# Patient Record
Sex: MALE | Race: WHITE
[De-identification: names, ages, dates, MRNs, and addresses within clinical notes are randomized per-mention and may not be internally consistent; named-entity substitution may affect disease eponyms.]

---

## 2017-10-22 ENCOUNTER — HEALTH MAINTENANCE LETTER (OUTPATIENT)
Age: 22
End: 2017-10-22

## 2017-11-12 ENCOUNTER — HEALTH MAINTENANCE LETTER (OUTPATIENT)
Age: 22
End: 2017-11-12

## 2018-01-15 ENCOUNTER — OFFICE VISIT (OUTPATIENT)
Dept: FAMILY MEDICINE | Facility: CLINIC | Age: 23
End: 2018-01-15
Payer: COMMERCIAL

## 2018-01-15 VITALS
TEMPERATURE: 97.8 F | HEIGHT: 76 IN | BODY MASS INDEX: 31.78 KG/M2 | WEIGHT: 261 LBS | HEART RATE: 70 BPM | SYSTOLIC BLOOD PRESSURE: 120 MMHG | DIASTOLIC BLOOD PRESSURE: 68 MMHG

## 2018-01-15 DIAGNOSIS — R07.0 THROAT PAIN: ICD-10-CM

## 2018-01-15 DIAGNOSIS — J03.90 TONSILLITIS: Primary | ICD-10-CM

## 2018-01-15 DIAGNOSIS — H61.23 BILATERAL IMPACTED CERUMEN: ICD-10-CM

## 2018-01-15 LAB
DEPRECATED S PYO AG THROAT QL EIA: NORMAL
SPECIMEN SOURCE: NORMAL

## 2018-01-15 PROCEDURE — 87880 STREP A ASSAY W/OPTIC: CPT | Performed by: PHYSICIAN ASSISTANT

## 2018-01-15 PROCEDURE — 69210 REMOVE IMPACTED EAR WAX UNI: CPT | Mod: 50 | Performed by: PHYSICIAN ASSISTANT

## 2018-01-15 PROCEDURE — 87081 CULTURE SCREEN ONLY: CPT | Performed by: PHYSICIAN ASSISTANT

## 2018-01-15 PROCEDURE — 99213 OFFICE O/P EST LOW 20 MIN: CPT | Mod: 25 | Performed by: PHYSICIAN ASSISTANT

## 2018-01-15 RX ORDER — DEXAMETHASONE 4 MG/1
8 TABLET ORAL
Qty: 4 TABLET | Refills: 0 | Status: SHIPPED | OUTPATIENT
Start: 2018-01-15 | End: 2018-01-17

## 2018-01-15 NOTE — LETTER
January 17, 2018      Jose Enrique Beaver  [address] [facility] [address]            The results of your recent throat culture were negative.  If you have any further questions or concerns please contact the clinic            Sincerely,        Nannette Grady PA-C/stephy

## 2018-01-15 NOTE — NURSING NOTE
"Chief Complaint   Patient presents with     Throat Problem       Initial /68  Pulse 70  Temp 97.8  F (36.6  C) (Tympanic)  Ht 6' 3.75\" (1.924 m)  Wt 261 lb (118.4 kg)  BMI 31.98 kg/m2 Estimated body mass index is 31.98 kg/(m^2) as calculated from the following:    Height as of this encounter: 6' 3.75\" (1.924 m).    Weight as of this encounter: 261 lb (118.4 kg).  Medication Reconciliation: complete     Matt Ruiz CMA    "

## 2018-01-15 NOTE — MR AVS SNAPSHOT
"              After Visit Summary   1/15/2018    Jose Enrique Beaver    MRN: [id number]           Patient Information     Date Of Birth          1995        Visit Information        Provider Department      1/15/2018 1:40 PM Nannette Grady PA-C [facility]        Today's Diagnoses     Tonsillitis    -  1    Throat pain        Bilateral impacted cerumen           Follow-ups after your visit        Who to contact     Normal or non-critical lab and imaging results will be communicated to you by [facility], letter or phone within 4 business days after the clinic has received the results. If you do not hear from us within 7 days, please contact the clinic through [facility] or phone. If you have a critical or abnormal lab result, we will notify you by phone as soon as possible.  Submit refill requests through [facility] or call your pharmacy and they will forward the refill request to us. Please allow 3 business days for your refill to be completed.          If you need to speak with a  for additional information , please call: [phone]             Additional Information About Your Visit        [facility] Information     [facility] lets you send messages to your doctor, view your test results, renew your prescriptions, schedule appointments and more. To sign up, go to [email or web address] . Click on \"Log in\" on the left side of the screen, which will take you to the Welcome page. Then click on \"Sign up Now\" on the right side of the page.     You will be asked to enter the access code listed below, as well as some personal information. Please follow the directions to create your username and password.     Your access code is: [email or web address]  Expires: 4/15/2018  2:14 PM     Your access code will  in 90 days. If you need help or a new code, please call your [facility] or [phone].        Care EveryWhere ID     This is your Care EveryWhere ID. This could be used by other " "organizations to access your [place] medical records  [id number]        Your Vitals Were     Pulse Temperature Height BMI (Body Mass Index)          70 97.8  F (36.6  C) (Tympanic) 6' 3.75\" (1.924 m) 31.98 kg/m2         Blood Pressure from Last 3 Encounters:   01/15/18 120/68   07/07/15 122/70   01/16/14 124/76    Weight from Last 3 Encounters:   01/15/18 261 lb (118.4 kg)   07/07/15 255 lb 8 oz (115.9 kg) (>99 %)*   01/16/14 257 lb 4.8 oz (116.7 kg) (>99 %)*     * Growth percentiles are based on CDC 2-20 Years data.              We Performed the Following     Beta strep group A culture     REMOVE IMPACTED CERUMEN     Strep, Rapid Screen          Today's Medication Changes          These changes are accurate as of: 1/15/18  3:30 PM.  If you have any questions, ask your nurse or doctor.               Start taking these medicines.        Dose/Directions    dexamethasone 4 MG tablet   Commonly known as:  DECADRON   Used for:  Throat pain, Tonsillitis   Started by:  Nannette Grady PA-C        Dose:  8 mg   Take 2 tablets (8 mg) by mouth daily (with breakfast) for 2 days   Quantity:  4 tablet   Refills:  0            Where to get your medicines      These medications were sent to [facility] [address] [facility]  [address] [facility] [address]     Phone:  [phone]     dexamethasone 4 MG tablet                Primary Care Provider Office Phone # Fax #    Sami Melendez [phone]       [facility] [id number] [place] [address]        Equal Access to Services     PABLO HA AH: Hadii amelia vela Sokate, waaxda luqadaha, qaybta kaalmada adenathalieyada, katelyn morris. So [facility] [phone].    ATENCIÓN: Si habla español, tiene a ferguson disposición servicios gratuitos de asistencia lingüística. Llame al [phone].    We comply with applicable federal civil rights laws and [place] laws. We do not " discriminate on the basis of race, color, national origin, age, disability, sex, sexual orientation, or gender identity.            Thank you!     Thank you for choosing [facility]  for your care. Our goal is always to provide you with excellent care. Hearing back from our patients is one way we can continue to improve our services. Please take a few minutes to complete the written survey that you may receive in the mail after your visit with us. Thank you!             Your Updated Medication List - Protect others around you: Learn how to safely use, store and throw away your medicines at www.disposemymeds.org.          This list is accurate as of: 1/15/18  3:30 PM.  Always use your most recent med list.                   Brand Name Dispense Instructions for use Diagnosis    BENADRYL ALLERGY PO      PRN        clindamycin 1 % topical gel    CLINDAMAX     Apply topically daily        dexamethasone 4 MG tablet    DECADRON    4 tablet    Take 2 tablets (8 mg) by mouth daily (with breakfast) for 2 days    Throat pain, Tonsillitis       EPIDUO 0.1-2.5 % gel   Generic drug:  adapalene-benzoyl peroxide      Apply topically daily        EPINEPHrine 0.3 MG/0.3ML injection 2-pack    EPIPEN/ADRENACLICK/or ANY BX GENERIC EQUIV    2 each    Inject 0.3 mLs (0.3 mg) into the muscle once as needed for anaphylaxis    Allergy to peanuts, Allergy to peanuts       ZYRTEC ALLERGY 10 MG Caps   Generic drug:  cetirizine HCl

## 2018-01-15 NOTE — PROGRESS NOTES
"  SUBJECTIVE:   Jose Enrique Beaver is a 22 year old male who presents to clinic today for the following health issues:      ENT Symptoms             Symptoms: cc Present Absent Comment   Fever/Chills   x    Fatigue  x     Muscle Aches   x    Eye Irritation   x    Sneezing   x    Nasal Arsenio/Drg   x    Sinus Pressure/Pain   x    Loss of smell   x    Dental pain  x  Allan and teeth hurt, hurts when biting down    Sore Throat  x     Swollen Glands  x     Ear Pain/Fullness  x  Both ears, pressure and pain    Cough   x    Wheeze   x    Chest Pain   x    Shortness of breath   x    Rash   x    Other  x  Headache     Symptom duration:  1 week    Symptom severity:  moderate    Treatments tried:  Advil    Contacts:  None       Throat really started to bother him more in the last 3-4 days  Really hurts to swallow  Difficult to swallow as well due to swelling  No issues breathing or feeling SOB  No cough or chest symptoms        Problem list and histories reviewed & adjusted, as indicated.  Additional history: as documented    BP Readings from Last 3 Encounters:   01/15/18 120/68   07/07/15 122/70   01/16/14 124/76    Wt Readings from Last 3 Encounters:   01/15/18 261 lb (118.4 kg)   07/07/15 255 lb 8 oz (115.9 kg) (>99 %)*   01/16/14 257 lb 4.8 oz (116.7 kg) (>99 %)*     * Growth percentiles are based on CDC 2-20 Years data.                      Reviewed and updated as needed this visit by clinical staff     Reviewed and updated as needed this visit by Provider         ROS:  Remainder of ROS obtained and found to be negative other than that which was documented above      OBJECTIVE:     /68  Pulse 70  Temp 97.8  F (36.6  C) (Tympanic)  Ht 6' 3.75\" (1.924 m)  Wt 261 lb (118.4 kg)  BMI 31.98 kg/m2  Body mass index is 31.98 kg/(m^2).  GENERAL: healthy, alert and no distress  EYES: Eyes grossly normal to inspection  HENT: cerumen impaction b/l-- after irrigation: right TM normal, left ear canal and TM with moderate impaction " still. nose and mouth without ulcers or lesions  NECK: b/l shotty cervical adenopathy  RESP: lungs clear to auscultation - no rales, rhonchi or wheezes  CV: regular rates and rhythm, normal S1 S2, no S3 or S4 and no murmur, click or rub    Diagnostic Test Results:  Strep screen - Negative    ASSESSMENT/PLAN:       ICD-10-CM    1. Tonsillitis J03.90 dexamethasone (DECADRON) 4 MG tablet   2. Throat pain R07.0 Strep, Rapid Screen     dexamethasone (DECADRON) 4 MG tablet     Beta strep group A culture   3. Bilateral impacted cerumen H61.23 REMOVE IMPACTED CERUMEN     Rapid negative. Will continue symptomatic management while awaiting culture. Given size of tonsils and discomfort, treat with decadron x2 days. Discussed concerning signs that should prompt another evaluation.     Both ear canals impacted with cerumen. Irrigation performed by MA followed by manual disimpaction of cerumen remaining on left side. Still some remaining - will likely drain out over next few days    Nannette Grady PA-C  [facility]

## 2018-01-16 LAB
BACTERIA SPEC CULT: NORMAL
SPECIMEN SOURCE: NORMAL

## 2018-12-19 ENCOUNTER — OFFICE VISIT (OUTPATIENT)
Dept: FAMILY MEDICINE | Facility: CLINIC | Age: 23
End: 2018-12-19
Payer: COMMERCIAL

## 2018-12-19 VITALS
TEMPERATURE: 98.2 F | SYSTOLIC BLOOD PRESSURE: 139 MMHG | HEART RATE: 97 BPM | BODY MASS INDEX: 33.82 KG/M2 | HEIGHT: 76 IN | WEIGHT: 277.7 LBS | DIASTOLIC BLOOD PRESSURE: 88 MMHG

## 2018-12-19 DIAGNOSIS — B86 SCABIES: Primary | ICD-10-CM

## 2018-12-19 PROCEDURE — 99213 OFFICE O/P EST LOW 20 MIN: CPT | Performed by: FAMILY MEDICINE

## 2018-12-19 RX ORDER — PERMETHRIN 50 MG/G
CREAM TOPICAL ONCE
Qty: 60 G | Refills: 3 | Status: SHIPPED | OUTPATIENT
Start: 2018-12-19 | End: 2018-12-19

## 2018-12-19 ASSESSMENT — MIFFLIN-ST. JEOR: SCORE: 2357.17

## 2018-12-19 ASSESSMENT — PAIN SCALES - GENERAL: PAINLEVEL: NO PAIN (0)

## 2018-12-19 NOTE — PROGRESS NOTES
SUBJECTIVE:                                                    Jose Enrique Beaver is a 23 year old male who presents to clinic today for the following health issues:    Rash  Onset: two weeks    Description:   Location: backside, legs and arms  Character: raised  Itching (Pruritis): YES    Progression of Symptoms:  worsening    Accompanying Signs & Symptoms:  Fever: no   Body aches or joint pain: no   Sore throat symptoms: no   Recent cold symptoms: no     History:   Previous similar rash: no     Precipitating factors:   Exposure to similar rash: YES, he thinks his dad had the same thing back in august. He thinks his dad had scabies.  New exposures: None   Recent travel: no     Alleviating factors:  Anti-itch cream helped with the itching. He also tried clariten    Therapies Tried and outcome: Claritin and anti-itch cream. Seemed to help with the itching.     Problem list and histories reviewed & adjusted, as indicated.  Additional history:     Patient Active Problem List   Diagnosis     Allergy to peanuts     Acne     Past Surgical History:   Procedure Laterality Date     ARTHROSCOPY ANKLE  3/1/2013    Procedure: ARTHROSCOPY ANKLE;  Left ankle arthroscopy Osteochondral dessicans drilling;  Surgeon: Quinton Macias MD;  Location: [facility] OR     CIRCUMCISION,CLAMP  1996       Social History     Tobacco Use     Smoking status: Never Smoker     Smokeless tobacco: Never Used   Substance Use Topics     Alcohol use: No     Family History   Problem Relation Age of Onset     Asthma Father      Hypertension Father      Lipids Maternal Grandmother      Gastrointestinal Disease Maternal Grandmother         Non-alcoholic Serosis     Cancer Maternal Grandfather      Lipids Maternal Grandfather      Lipids Paternal Grandmother      Heart Disease Paternal Grandfather      Lipids Paternal Grandfather      Myocardial Infarction Paternal Grandfather      Myocardial Infarction Maternal Aunt         UNDER AGE 50      "      ROS:  Constitutional, HEENT, cardiovascular, pulmonary, gi and gu systems are negative, except as otherwise noted.    OBJECTIVE:                                                    /88 (BP Location: Right arm, Patient Position: Sitting, Cuff Size: Adult Large)   Pulse 97   Temp 98.2  F (36.8  C) (Tympanic)   Ht 1.924 m (6' 3.75\")   Wt 126 kg (277 lb 11.2 oz)   BMI 34.03 kg/m   Body mass index is 34.03 kg/m .   GENERAL: healthy, alert, well nourished, well hydrated, no distress  HENT: ear canals- normal; TMs- normal; Nose- normal; Mouth- no ulcers, no lesions  NECK: no tenderness, no adenopathy, no asymmetry, no masses, no stiffness; thyroid- normal to palpation  RESP: lungs clear to auscultation - no rales, no rhonchi, no wheezes  CV: regular rates and rhythm, normal S1 S2, no S3 or S4 and no murmur, no click or rub -  ABDOMEN: soft, no tenderness, no  hepatosplenomegaly, no masses, normal bowel sounds  Hands has burrows between fingers.     ASSESSMENT/PLAN:                                                      (B86) Scabies  (primary encounter diagnosis)  Plan: permethrin (ELIMITE) 5 % external cream            reports that  has never smoked. he has never used smokeless tobacco.    Weight management plan: Discussed healthy diet and exercise guidelines      [facility]    "

## 2019-03-09 ENCOUNTER — OFFICE VISIT (OUTPATIENT)
Dept: DERMATOLOGY | Facility: CLINIC | Age: 24
End: 2019-03-09
Payer: COMMERCIAL

## 2019-03-09 VITALS — WEIGHT: 279.9 LBS | BODY MASS INDEX: 34.3 KG/M2

## 2019-03-09 DIAGNOSIS — L70.0 ACNE VULGARIS: ICD-10-CM

## 2019-03-09 DIAGNOSIS — L70.0 ACNE VULGARIS: Primary | ICD-10-CM

## 2019-03-09 DIAGNOSIS — Z79.899 ON ISOTRETINOIN THERAPY: ICD-10-CM

## 2019-03-09 LAB
ALBUMIN SERPL-MCNC: 4 G/DL (ref 3.4–5)
ALP SERPL-CCNC: 63 U/L (ref 40–150)
ALT SERPL W P-5'-P-CCNC: 77 U/L (ref 0–70)
ANION GAP SERPL CALCULATED.3IONS-SCNC: 7 MMOL/L (ref 3–14)
AST SERPL W P-5'-P-CCNC: 32 U/L (ref 0–45)
BASOPHILS # BLD AUTO: 0.1 10E9/L (ref 0–0.2)
BASOPHILS NFR BLD AUTO: 0.8 %
BILIRUB SERPL-MCNC: 0.3 MG/DL (ref 0.2–1.3)
BUN SERPL-MCNC: 11 MG/DL (ref 7–30)
CALCIUM SERPL-MCNC: 9 MG/DL (ref 8.5–10.1)
CHLORIDE SERPL-SCNC: 106 MMOL/L (ref 94–109)
CHOLEST SERPL-MCNC: 186 MG/DL
CO2 SERPL-SCNC: 26 MMOL/L (ref 20–32)
CREAT SERPL-MCNC: 0.84 MG/DL (ref 0.66–1.25)
DIFFERENTIAL METHOD BLD: NORMAL
EOSINOPHIL # BLD AUTO: 0.2 10E9/L (ref 0–0.7)
EOSINOPHIL NFR BLD AUTO: 2.8 %
ERYTHROCYTE [DISTWIDTH] IN BLOOD BY AUTOMATED COUNT: 12.7 % (ref 10–15)
GFR SERPL CREATININE-BSD FRML MDRD: >90 ML/MIN/{1.73_M2}
GLUCOSE SERPL-MCNC: 91 MG/DL (ref 70–99)
HCT VFR BLD AUTO: 45.2 % (ref 40–53)
HDLC SERPL-MCNC: 35 MG/DL
HGB BLD-MCNC: 14.3 G/DL (ref 13.3–17.7)
IMM GRANULOCYTES # BLD: 0 10E9/L (ref 0–0.4)
IMM GRANULOCYTES NFR BLD: 0.2 %
LDLC SERPL CALC-MCNC: 118 MG/DL
LYMPHOCYTES # BLD AUTO: 1.8 10E9/L (ref 0.8–5.3)
LYMPHOCYTES NFR BLD AUTO: 29.6 %
MCH RBC QN AUTO: 28.3 PG (ref 26.5–33)
MCHC RBC AUTO-ENTMCNC: 31.6 G/DL (ref 31.5–36.5)
MCV RBC AUTO: 89 FL (ref 78–100)
MONOCYTES # BLD AUTO: 0.4 10E9/L (ref 0–1.3)
MONOCYTES NFR BLD AUTO: 7.3 %
NEUTROPHILS # BLD AUTO: 3.6 10E9/L (ref 1.6–8.3)
NEUTROPHILS NFR BLD AUTO: 59.3 %
NONHDLC SERPL-MCNC: 151 MG/DL
NRBC # BLD AUTO: 0 10*3/UL
NRBC BLD AUTO-RTO: 0 /100
PLATELET # BLD AUTO: 348 10E9/L (ref 150–450)
POTASSIUM SERPL-SCNC: 4.2 MMOL/L (ref 3.4–5.3)
PROT SERPL-MCNC: 7.9 G/DL (ref 6.8–8.8)
RBC # BLD AUTO: 5.06 10E12/L (ref 4.4–5.9)
SODIUM SERPL-SCNC: 139 MMOL/L (ref 133–144)
TRIGL SERPL-MCNC: 165 MG/DL
WBC # BLD AUTO: 6 10E9/L (ref 4–11)

## 2019-03-09 RX ORDER — ISOTRETINOIN 40 MG/1
40 CAPSULE ORAL
Qty: 30 CAPSULE | Refills: 0 | Status: SHIPPED | OUTPATIENT
Start: 2019-03-09 | End: 2019-04-10

## 2019-03-09 ASSESSMENT — PAIN SCALES - GENERAL: PAINLEVEL: NO PAIN (0)

## 2019-03-09 NOTE — PROGRESS NOTES
"Referring Physician: Referred Self     Problems list:  1) Acne Vulgaris   -  Initial labs ordered with hopes of starting Accutane next visit, IPledge: [id number]    CC:   Chief Complaint   Patient presents with     Acne     Oneil is here today to be seen for acne on his \"face, neck, chest and back\". He would like to start medication.       HPI:   We had the pleasure of seeing Jose Enrique in our Dermatology clinic today, in consultation from Referred Self for evaluation of ance. The patient has had acne since age 16 and flares have come and gone. He has a beard that he has grown for several years that does not seem to affect his skin. The patient has been on amoxicillin to treat acne for a couple months but did not find that effective. His parents are encouraging him to start Accutane because it was effective for them.     The patient is otherwise well.       Past Medical/Surgical History: Pt was treated for scabies last year.   Family History: Older sister and parents had severe acne and on acne.  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 Minimal alcohol use.   Medications:   Current Outpatient Medications   Medication Sig Dispense Refill     BENADRYL ALLERGY OR PRN       cetirizine HCl (ZYRTEC ALLERGY) 10 MG CAPS        EPINEPHrine (EPIPEN) 0.3 MG/0.3ML injection Inject 0.3 mLs (0.3 mg) into the muscle once as needed for anaphylaxis 2 each 1     dexamethasone (DECADRON) 4 MG tablet Take 2 tablets (8 mg) by mouth daily (with breakfast) for 2 days 4 tablet 0      Allergies:   Allergies   Allergen Reactions     Cat Hair [Cats]      Dogs      Peanuts [Nuts]      Hives, tounge swells        A 12 point ROS was performed and is negative except for those outlined in the HPI. No history of depression. No GI symptoms, such as hematochezia.   Physical examination: Wt 127 kg (279 lb 14.4 oz)   BMI 34.30 kg/m      Skin: An acne skin examination and palpation of skin and subcutaneous tissues of the face, chest, and " back was performed and was normal except as noted below:  - Scattered inflammatory papules on central back, posterior shoulders, clavicle, chest, mandible, and few periorally   - Forehead relatively sparred  - No other lesions of concern were noted during this exam  In office labs or procedures performed today:   None  Assessment / Plan:  1. Ance Vulgaris   Accutane is discussed fully with the patient. It is a very effective drug to treat acne vulgaris but has many significant side effects. Chief among these are teratogensis, hepatic injury, dyslipidemia and severe drying of the mucous membranes. All of these issues have been discussed in details. Monthly blood tests to monitor lipids and liver functions will be necessary. Expect painful dryness and/or fissuring around the lips, eyes, and other moist areas of the body. Balms may be protective. Contact lens may be too painful to wear temporarily while on this drug. Episodes of significant depression have been reported, including suicidal ideation and attempts in rare cases. It may also cause pseudotumor cerebri and hyperostosis. The patient will report any such changes in mood, depressive symptoms or suicidal thoughts, headaches, joint or bone pains.    Female patients MUST use two simultaneous methods of family planning. Accutane is Category X for pregnancy, meaning it will cause fetal teratogenic malformations, and pregnancy MUST be avoided while on this drug.    After discussion of these important issues, he indicates complete understanding of all of the above, and does wish to proceed with Accutane therapy.Consent was signed.   Plan for 150 mg/kg- 220 mg/kg. 19,050 mg- 27, 940 mg.     IPledge: [id number]      Follow-up in 1 month to start Accutane     Staff Signatures   I, Gina Carroll, am serving as a scribe to document services personally performed by Kiara JOHNSON, based on data collection and the provider's statements to me.     Provider Disclosure:    The documentation recorded by the scribe accurately reflects the services I personally performed and the decisions made by me.    All risks, benefits and alternatives were discussed with patient.  Patient is in agreement and understands the assessment and plan.  All questions were answered.  Sun Screen Education was given.   Return to Clinic in 1 month or sooner as needed.   Kiara Mahajan PA-C   [facility]

## 2019-03-09 NOTE — LETTER
"3/9/2019       RE: Jose Enrique Beaver  [address] [place] [address]     Dear Colleague,    Thank you for referring your patient, Jose Enrique Beaver, to the [facility] DERMATOLOG[facility] at [facility]. Please see a copy of my visit note below.    Referring Physician: Referred Self     Problems list:  1) Acne Vulgaris   -  Initial labs ordered with hopes of starting Accutane next visit, IPledge: [id number]    CC:   Chief Complaint   Patient presents with     Acne     Oneil is here today to be seen for acne on his \"face, neck, chest and back\". He would like to start medication.       HPI:   We had the pleasure of seeing Jose Enrique in our Dermatology clinic today, in consultation from Referred Self for evaluation of ance. The patient has had acne since age 16 and flares have come and gone. He has a beard that he has grown for several years that does not seem to affect his skin. The patient has been on amoxicillin to treat acne for a couple months but did not find that effective. His parents are encouraging him to start Accutane because it was effective for them.     The patient is otherwise well.       Past Medical/Surgical History: Pt was treated for scabies last year.   Family History: Older sister and parents had severe acne and on acne.  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   Medications:   Current Outpatient Medications   Medication Sig Dispense Refill     BENADRYL ALLERGY OR PRN       cetirizine HCl (ZYRTEC ALLERGY) 10 MG CAPS        EPINEPHrine (EPIPEN) 0.3 MG/0.3ML injection Inject 0.3 mLs (0.3 mg) into the muscle once as needed for anaphylaxis 2 each 1     dexamethasone (DECADRON) 4 MG tablet Take 2 tablets (8 mg) by mouth daily (with breakfast) for 2 days 4 tablet 0      Allergies:   Allergies   Allergen Reactions     Cat Hair [Cats]      Dogs      Peanuts [Nuts]      Hives, tounge swells        A 12 point ROS was performed and is negative " except for those outlined in the HPI. No history of depression. No GI symptoms, such as hematochezia.   Physical examination: Wt 127 kg (279 lb 14.4 oz)   BMI 34.30 kg/m       Skin: An acne skin examination and palpation of skin and subcutaneous tissues of the face, chest, and back was performed and was normal except as noted below:  - Scattered inflammatory papules on central back, posterior shoulders, clavicle, chest, mandible, and few periorally   - Forehead relatively sparred  - No other lesions of concern were noted during this exam  In office labs or procedures performed today:   None  Assessment / Plan:  1. Ance Vulgaris   Accutane is discussed fully with the patient. It is a very effective drug to treat acne vulgaris but has many significant side effects. Chief among these are teratogensis, hepatic injury, dyslipidemia and severe drying of the mucous membranes. All of these issues have been discussed in details. Monthly blood tests to monitor lipids and liver functions will be necessary. Expect painful dryness and/or fissuring around the lips, eyes, and other moist areas of the body. Balms may be protective. Contact lens may be too painful to wear temporarily while on this drug. Episodes of significant depression have been reported, including suicidal ideation and attempts in rare cases. It may also cause pseudotumor cerebri and hyperostosis. The patient will report any such changes in mood, depressive symptoms or suicidal thoughts, headaches, joint or bone pains.    Female patients MUST use two simultaneous methods of family planning. Accutane is Category X for pregnancy, meaning it will cause fetal teratogenic malformations, and pregnancy MUST be avoided while on this drug.    After discussion of these important issues, he indicates complete understanding of all of the above, and does wish to proceed with Accutane therapy.Consent was signed.   Plan for 150 mg/kg- 220 mg/kg. 19,050 mg- 27, 940 mg.      IPledge: [id number]      Follow-up in 1 month to start Accutane     Staff Signatures   I, Gina Carroll, am serving as a scribe to document services personally performed by Kiara JOHNSON, based on data collection and the provider's statements to me.     Provider Disclosure:   The documentation recorded by the scribe accurately reflects the services I personally performed and the decisions made by me.    All risks, benefits and alternatives were discussed with patient.  Patient is in agreement and understands the assessment and plan.  All questions were answered.  Sun Screen Education was given.   Return to Clinic in 1 month or sooner as needed.     Again, thank you for allowing me to participate in the care of your patient.      Sincerely,    Kiara Mahajan PA-C

## 2019-03-09 NOTE — NURSING NOTE
"Chief Complaint   Patient presents with     Acne     Oneil is here today to be seen for acne on his \"face, neck, chest and back\". He would like to start medication.      Germania Zuniga LPN  "

## 2019-04-10 ENCOUNTER — OFFICE VISIT (OUTPATIENT)
Dept: DERMATOLOGY | Facility: CLINIC | Age: 24
End: 2019-04-10
Payer: COMMERCIAL

## 2019-04-10 VITALS — BODY MASS INDEX: 33.45 KG/M2 | WEIGHT: 273 LBS

## 2019-04-10 DIAGNOSIS — Z79.899 ON ISOTRETINOIN THERAPY: ICD-10-CM

## 2019-04-10 DIAGNOSIS — L70.0 ACNE VULGARIS: ICD-10-CM

## 2019-04-10 DIAGNOSIS — L70.0 ACNE VULGARIS: Primary | ICD-10-CM

## 2019-04-10 LAB
ALBUMIN SERPL-MCNC: 4.1 G/DL (ref 3.4–5)
ALP SERPL-CCNC: 66 U/L (ref 40–150)
ALT SERPL W P-5'-P-CCNC: 62 U/L (ref 0–70)
ANION GAP SERPL CALCULATED.3IONS-SCNC: 5 MMOL/L (ref 3–14)
AST SERPL W P-5'-P-CCNC: 27 U/L (ref 0–45)
BASOPHILS # BLD AUTO: 0 10E9/L (ref 0–0.2)
BASOPHILS NFR BLD AUTO: 0.7 %
BILIRUB SERPL-MCNC: 0.4 MG/DL (ref 0.2–1.3)
BUN SERPL-MCNC: 8 MG/DL (ref 7–30)
CALCIUM SERPL-MCNC: 9.3 MG/DL (ref 8.5–10.1)
CHLORIDE SERPL-SCNC: 105 MMOL/L (ref 94–109)
CO2 SERPL-SCNC: 26 MMOL/L (ref 20–32)
CREAT SERPL-MCNC: 0.83 MG/DL (ref 0.66–1.25)
DIFFERENTIAL METHOD BLD: NORMAL
EOSINOPHIL # BLD AUTO: 0.1 10E9/L (ref 0–0.7)
EOSINOPHIL NFR BLD AUTO: 3 %
ERYTHROCYTE [DISTWIDTH] IN BLOOD BY AUTOMATED COUNT: 12.5 % (ref 10–15)
GFR SERPL CREATININE-BSD FRML MDRD: >90 ML/MIN/{1.73_M2}
GLUCOSE SERPL-MCNC: 92 MG/DL (ref 70–99)
HCT VFR BLD AUTO: 44.3 % (ref 40–53)
HGB BLD-MCNC: 14.2 G/DL (ref 13.3–17.7)
IMM GRANULOCYTES # BLD: 0 10E9/L (ref 0–0.4)
IMM GRANULOCYTES NFR BLD: 0.2 %
LYMPHOCYTES # BLD AUTO: 1.7 10E9/L (ref 0.8–5.3)
LYMPHOCYTES NFR BLD AUTO: 40.9 %
MCH RBC QN AUTO: 28.5 PG (ref 26.5–33)
MCHC RBC AUTO-ENTMCNC: 32.1 G/DL (ref 31.5–36.5)
MCV RBC AUTO: 89 FL (ref 78–100)
MONOCYTES # BLD AUTO: 0.3 10E9/L (ref 0–1.3)
MONOCYTES NFR BLD AUTO: 7.6 %
NEUTROPHILS # BLD AUTO: 1.9 10E9/L (ref 1.6–8.3)
NEUTROPHILS NFR BLD AUTO: 47.6 %
NRBC # BLD AUTO: 0 10*3/UL
NRBC BLD AUTO-RTO: 0 /100
PLATELET # BLD AUTO: 363 10E9/L (ref 150–450)
POTASSIUM SERPL-SCNC: 3.9 MMOL/L (ref 3.4–5.3)
PROT SERPL-MCNC: 7.7 G/DL (ref 6.8–8.8)
RBC # BLD AUTO: 4.98 10E12/L (ref 4.4–5.9)
SODIUM SERPL-SCNC: 136 MMOL/L (ref 133–144)
TRIGL SERPL-MCNC: 274 MG/DL
WBC # BLD AUTO: 4.1 10E9/L (ref 4–11)

## 2019-04-10 RX ORDER — ISOTRETINOIN 40 MG/1
80 CAPSULE ORAL
Qty: 60 CAPSULE | Refills: 0 | Status: SHIPPED | OUTPATIENT
Start: 2019-04-10 | End: 2019-05-15

## 2019-04-10 ASSESSMENT — PAIN SCALES - GENERAL: PAINLEVEL: NO PAIN (0)

## 2019-04-10 NOTE — LETTER
4/10/2019       RE: Jose Enrique Beaver  [address] [place] [address]     Dear Colleague,    Thank you for referring your patient, Jose Enrique Beaver, to the [facility] DERMATOLOGY at [facility]. Please see a copy of my visit note below.    [facility] Dermatology Note      Dermatology Problem List:  1. Acne vulgaris,   - Initiated isotretinoin 40 mg daily on 3/9/19, increased to 80 mg on 4/10/19. IPledge #[id number], currently at the end of month # 1    Encounter Date: Apr 10, 2019    CC:  Chief Complaint   Patient presents with     Derm Problem     Accutane follow up, Oneil states his acne has improved.      History of Present Illness:  Mr. Jose Enrique Beaver is a 23 year old male who presents today in follow up for Accutane. The patient was last seen in the dermatology clinic on 03/09/19 during which he started Accutane 40 mg daily. Baseline labs were taken at this visit (CBC, CMP with diff and lipids), which returned with mildly elevated triglycerides. Today he is at the end of gisselle # 1 of the medication. He reports that his acne has improved, but he is still experiencing mild breakouts.     The patient reports tolerable mucocutaneous dryness, and denies arthralgias, myalgias, depression, suicidal ideation, diarrhea, headache, or blurred vision.      Past Medical History:   Patient Active Problem List   Diagnosis     Allergy to peanuts     Acne     Past Medical History:   Diagnosis Date     NO ACTIVE PROBLEMS      Past Surgical History:   Procedure Laterality Date     ARTHROSCOPY ANKLE  3/1/2013    Procedure: ARTHROSCOPY ANKLE;  Left ankle arthroscopy Osteochondral dessicans drilling;  Surgeon: Quinton Macias MD;  Location: [place] OR     CIRCUMCISION,CLAMP  1996       Social History:  Patient reports that he has never smoked. He has never used smokeless tobacco. He reports that he does not drink alcohol or use drugs.   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of  [employer], studying [employer] science    Family History:  Family History   Problem Relation Age of Onset     Asthma Father      Hypertension Father      Lipids Maternal Grandmother      Gastrointestinal Disease Maternal Grandmother         Non-alcoholic Serosis     Cancer Maternal Grandfather      Lipids Maternal Grandfather      Lipids Paternal Grandmother      Heart Disease Paternal Grandfather      Lipids Paternal Grandfather      Myocardial Infarction Paternal Grandfather      Myocardial Infarction Maternal Aunt         UNDER AGE 50       Medications:  Current Outpatient Medications   Medication Sig Dispense Refill     BENADRYL ALLERGY OR PRN       cetirizine HCl (ZYRTEC ALLERGY) 10 MG CAPS        dexamethasone (DECADRON) 4 MG tablet Take 2 tablets (8 mg) by mouth daily (with breakfast) for 2 days 4 tablet 0     EPINEPHrine (EPIPEN) 0.3 MG/0.3ML injection Inject 0.3 mLs (0.3 mg) into the muscle once as needed for anaphylaxis 2 each 1     ISOtretinoin (ACCUTANE) 40 MG capsule Take 1 capsule (40 mg) by mouth daily with food 30 capsule 0       Allergies   Allergen Reactions     Cat Hair [Cats]      Dogs      Peanuts [Nuts]      Hives, tounge swells         Review of Systems:  -Constitutional: The patient denies fatigue, fevers, chills, unintended weight loss, and night sweats.  -Neuro: no HA or vision changes  -GI: No nausea, blood in stool, diarrhea, hx of IBD  -Psych: no depression/anxiety, mood changes, or sleep problems   -Musculoskeletal: no joint or muscle pain or swelling   -Heme/Lymph: no concerning bumps, no bleeding problems  -Skin: As above in HPI. No additional skin concerns.      Physical exam:  Vitals: There were no vitals taken for this visit.  GEN: This is a well developed, well-nourished male in no acute distress, in a pleasant mood.    SKIN: Waist-up skin, which includes the head/face, neck, both arms, chest, back, abdomen, digits and/or nails was examined.  - There are 8 resolving papules throughout the  back and posterior shoulders   -There are a few superficial pustules on the central chest, with scarring noted to upper chest   -There are 3-4 cystic papules on his right upper neck  -No other lesions of concern on areas examined.       Impression/Plan:  1. Acne vulgaris, on isotretinoin- at the end of month # 1   Reminded pt of side effects, including dryness. Advised regular use of emollients.  Recommend taking medication with a food containing fat.    At this visit, we will increase daily dose of Accutane to 80mg daily. Discussed increased side effects associated with increased dose.  One month supply with no refills provided.  Goal dose is 19,050 to 27,940 mg for 150-220 mg/kg dosing in this 127 kg patient.    Baseline labs including CBC, BUN/Cr, fasting lipids will be obtained. Pt is fasting today. His triglycerides were elevated. Recommend a fish oil supplement twice daily.     Total cumulative dose 1,200 mg (9.4 mg/kg)  Patient's I-pledge # is [id number].     The patient will stop all acne medications     Follow-up in 1 month, earlier for new or changing lesions.     Staff Involved:  Scribe/Staff    Scribe Disclosure:   Kellie BOUDREAUX, am serving as a scribe to document services personally performed by Kiara Mahajan PA-C, based on data collection and the provider's statements to me.    Provider Disclosure:   The documentation recorded by the scribe accurately reflects the services I personally performed and the decisions made by me.    All risks, benefits and alternatives were discussed with patient.  Patient is in agreement and understands the assessment and plan.  All questions were answered.  Sun Screen Education was given.   Return to Clinic in 1 month or sooner as needed.       Again, thank you for allowing me to participate in the care of your patient.      Sincerely,    Kiara Mahajan PA-C

## 2019-04-10 NOTE — PROGRESS NOTES
[facility] Dermatology Note      Dermatology Problem List:  1. Acne vulgaris,   - Initiated isotretinoin 40 mg daily on 3/9/19, increased to 80 mg on 4/10/19. IPledge #[id number], currently at the end of month # 1    Encounter Date: Apr 10, 2019    CC:  Chief Complaint   Patient presents with     Derm Problem     Accutane follow up, Oneil states his acne has improved.      History of Present Illness:  Mr. Jose Enrique Beaver is a 23 year old male who presents today in follow up for Accutane. The patient was last seen in the dermatology clinic on 03/09/19 during which he started Accutane 40 mg daily. Baseline labs were taken at this visit (CBC, CMP with diff and lipids), which returned with mildly elevated triglycerides. Today he is at the end of gisselle # 1 of the medication. He reports that his acne has improved, but he is still experiencing mild breakouts.     The patient reports tolerable mucocutaneous dryness, and denies arthralgias, myalgias, depression, suicidal ideation, diarrhea, headache, or blurred vision.      Past Medical History:   Patient Active Problem List   Diagnosis     Allergy to peanuts     Acne     Past Medical History:   Diagnosis Date     NO ACTIVE PROBLEMS      Past Surgical History:   Procedure Laterality Date     ARTHROSCOPY ANKLE  3/1/2013    Procedure: ARTHROSCOPY ANKLE;  Left ankle arthroscopy Osteochondral dessicans drilling;  Surgeon: Quinton Macias MD;  Location: [place] OR     CIRCUMCISION,CLAMP  1996       Social History:  Patient reports that he has never smoked. He has never used smokeless tobacco. He reports that he does not drink alcohol or use drugs.   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[employer], studying computer science    Family History:  Family History   Problem Relation Age of Onset     Asthma Father      Hypertension Father      Lipids Maternal Grandmother      Gastrointestinal Disease Maternal Grandmother         Non-alcoholic Serosis     Cancer Maternal  Grandfather      Lipids Maternal Grandfather      Lipids Paternal Grandmother      Heart Disease Paternal Grandfather      Lipids Paternal Grandfather      Myocardial Infarction Paternal Grandfather      Myocardial Infarction Maternal Aunt         UNDER AGE 50       Medications:  Current Outpatient Medications   Medication Sig Dispense Refill     BENADRYL ALLERGY OR PRN       cetirizine HCl (ZYRTEC ALLERGY) 10 MG CAPS        dexamethasone (DECADRON) 4 MG tablet Take 2 tablets (8 mg) by mouth daily (with breakfast) for 2 days 4 tablet 0     EPINEPHrine (EPIPEN) 0.3 MG/0.3ML injection Inject 0.3 mLs (0.3 mg) into the muscle once as needed for anaphylaxis 2 each 1     ISOtretinoin (ACCUTANE) 40 MG capsule Take 1 capsule (40 mg) by mouth daily with food 30 capsule 0       Allergies   Allergen Reactions     Cat Hair [Cats]      Dogs      Peanuts [Nuts]      Hives, tounge swells         Review of Systems:  -Constitutional: The patient denies fatigue, fevers, chills, unintended weight loss, and night sweats.  -Neuro: no HA or vision changes  -GI: No nausea, blood in stool, diarrhea, hx of IBD  -Psych: no depression/anxiety, mood changes, or sleep problems   -Musculoskeletal: no joint or muscle pain or swelling   -Heme/Lymph: no concerning bumps, no bleeding problems  -Skin: As above in HPI. No additional skin concerns.      Physical exam:  Vitals: There were no vitals taken for this visit.  GEN: This is a well developed, well-nourished male in no acute distress, in a pleasant mood.    SKIN: Waist-up skin, which includes the head/face, neck, both arms, chest, back, abdomen, digits and/or nails was examined.  - There are 8 resolving papules throughout the back and posterior shoulders   -There are a few superficial pustules on the central chest, with scarring noted to upper chest   -There are 3-4 cystic papules on his right upper neck  -No other lesions of concern on areas examined.       Impression/Plan:  1. Acne vulgaris,  on isotretinoin- at the end of month # 1   Reminded pt of side effects, including dryness. Advised regular use of emollients.  Recommend taking medication with a food containing fat.    At this visit, we will increase daily dose of Accutane to 80mg daily. Discussed increased side effects associated with increased dose.  One month supply with no refills provided.  Goal dose is 19,050 to 27,940 mg for 150-220 mg/kg dosing in this 127 kg patient.    Baseline labs including CBC, BUN/Cr, fasting lipids will be obtained. Pt is fasting today. His triglycerides were elevated. Recommend a fish oil supplement twice daily.     Total cumulative dose 1,200 mg (9.4 mg/kg)  Patient's I-pledge # is [id number].     The patient will stop all acne medications     Follow-up in 1 month, earlier for new or changing lesions.     Staff Involved:  Scribe/Staff    Scribe Disclosure:   Kellie BOUDREAUX, am serving as a scribe to document services personally performed by Kiara Mahajan PA-C, based on data collection and the provider's statements to me.    Provider Disclosure:   The documentation recorded by the scribe accurately reflects the services I personally performed and the decisions made by me.    All risks, benefits and alternatives were discussed with patient.  Patient is in agreement and understands the assessment and plan.  All questions were answered.  Sun Screen Education was given.   Return to Clinic in 1 month or sooner as needed.   Kiara Mahajan PA-C   [facility] Dermatology Clinic

## 2019-04-10 NOTE — NURSING NOTE
Dermatology Rooming Note    Jose Enrique Beaver's goals for this visit include:   Chief Complaint   Patient presents with     Derm Problem     Accutane follow up, Oneil states his acne has improved.      Mona Garay LPN

## 2019-05-15 ENCOUNTER — OFFICE VISIT (OUTPATIENT)
Dept: DERMATOLOGY | Facility: CLINIC | Age: 24
End: 2019-05-15
Payer: COMMERCIAL

## 2019-05-15 DIAGNOSIS — Z79.899 ON ISOTRETINOIN THERAPY: ICD-10-CM

## 2019-05-15 DIAGNOSIS — L70.0 ACNE VULGARIS: Primary | ICD-10-CM

## 2019-05-15 DIAGNOSIS — L70.0 ACNE VULGARIS: ICD-10-CM

## 2019-05-15 LAB
ALT SERPL W P-5'-P-CCNC: 72 U/L (ref 0–70)
AST SERPL W P-5'-P-CCNC: 36 U/L (ref 0–45)
TRIGL SERPL-MCNC: 347 MG/DL

## 2019-05-15 RX ORDER — ISOTRETINOIN 40 MG/1
80 CAPSULE ORAL
Qty: 60 CAPSULE | Refills: 0 | Status: SHIPPED | OUTPATIENT
Start: 2019-05-15 | End: 2019-06-12

## 2019-05-15 ASSESSMENT — PAIN SCALES - GENERAL: PAINLEVEL: NO PAIN (0)

## 2019-05-15 NOTE — LETTER
5/15/2019       RE: Jose Enrique Beaver  [address] [place] [address]     Dear Colleague,    Thank you for referring your patient, Jose Enrique Beaver, to the [facility] DERMATOLOGY at [facility]. Please see a copy of my visit note below.    [facility] Dermatology Note      Dermatology Problem List:  1. Acne vulgaris,   - Initiated isotretinoin 40 mg daily on 3/9/19, increased to 80 mg on 4/10/19. IPledge #[id number], currently at the end of month # 2    Encounter Date: May 15, 2019    CC:  Chief Complaint   Patient presents with     Derm Problem     Jose Enrique is heere for follow up accutane     History of Present Illness:  Mr. Jose Enrique Beaver is a 23 year old male who presents today in follow up for Accutane. The patient was last seen in the dermatology clinic on 04/10/19 during which his daily dose of isotretinoin was increased to 80 mg. Today he is at the end of month # 2. He reports is skin is doing well. He hardly had acne this month.     The patient reports tolerable mucocutaneous dryness, and denies arthralgias, myalgias, depression, suicidal ideation, diarrhea, headache, or blurred vision.      Past Medical History:   Patient Active Problem List   Diagnosis     Allergy to peanuts     Acne     Past Medical History:   Diagnosis Date     NO ACTIVE PROBLEMS      Past Surgical History:   Procedure Laterality Date     ARTHROSCOPY ANKLE  3/1/2013    Procedure: ARTHROSCOPY ANKLE;  Left ankle arthroscopy Osteochondral dessicans drilling;  Surgeon: Quinton Macisa MD;  Location: [place] OR     CIRCUMCISION,CLAMP  1996       Social History:  Patient reports that he has never smoked. He has never used smokeless tobacco. He reports that he does not drink alcohol or use drugs.   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[employer], studying computer science    Family History:  Family History   Problem Relation Age of Onset     Asthma Father      Hypertension Father      Lipids  Maternal Grandmother      Gastrointestinal Disease Maternal Grandmother         Non-alcoholic Serosis     Cancer Maternal Grandfather      Lipids Maternal Grandfather      Lipids Paternal Grandmother      Heart Disease Paternal Grandfather      Lipids Paternal Grandfather      Myocardial Infarction Paternal Grandfather      Myocardial Infarction Maternal Aunt         UNDER AGE 50       Medications:  Current Outpatient Medications   Medication Sig Dispense Refill     BENADRYL ALLERGY OR PRN       cetirizine HCl (ZYRTEC ALLERGY) 10 MG CAPS        dexamethasone (DECADRON) 4 MG tablet Take 2 tablets (8 mg) by mouth daily (with breakfast) for 2 days 4 tablet 0     EPINEPHrine (EPIPEN) 0.3 MG/0.3ML injection Inject 0.3 mLs (0.3 mg) into the muscle once as needed for anaphylaxis 2 each 1     ISOtretinoin (ACCUTANE) 40 MG capsule Take 2 capsules (80 mg) by mouth daily with food 60 capsule 0       Allergies   Allergen Reactions     Cat Hair [Cats]      Dogs      Peanuts [Nuts]      Hives, tounge swells         Review of Systems:  -Constitutional: The patient denies fatigue, fevers, chills, unintended weight loss, and night sweats.  -Neuro: no HA or vision changes  -GI: No nausea, blood in stool, diarrhea, hx of IBD  -Psych: no depression/anxiety, mood changes, or sleep problems   -Musculoskeletal: no joint or muscle pain or swelling   -Heme/Lymph: no concerning bumps, no bleeding problems  -Skin: As above in HPI. No additional skin concerns.      Physical exam:  Vitals: There were no vitals taken for this visit.  GEN: This is a well developed, well-nourished male in no acute distress, in a pleasant mood.    SKIN: Waist-up skin, which includes the head/face, neck, both arms, chest, back, abdomen, digits and/or nails was examined.  - There are 1-2 resolving papules throughout the back and posterior shoulders. Scarring through out the back.   -There are a few superficial pustules on the central chest, with scarring noted to  upper chest   -There are 3-4 cystic papules on his left and right medial upper arms.   -No other lesions of concern on areas examined.       Impression/Plan:  1. Acne vulgaris, on isotretinoin- at the end of month # 2   Reminded pt of side effects, including dryness. Advised regular use of emollients.  Recommend taking medication with a food containing fat.    At this visit, we will increase daily dose of Accutane to 80mg daily. Discussed increased side effects associated with increased dose.  One month supply with no refills provided.  Goal dose is 19,050 to 27,940 mg for 150-220 mg/kg dosing in this 127 kg patient.    Labs to be obtained today: CBC, BUN/Cr, fasting lipids will be obtained. Pt is fasting today. His triglycerides were elevated.   Continue daily fish oil supplement. Hx elevated triglycerides. Continued elevated triglycerides. Hold dose of 80 mg daily.     Total cumulative dose 3,600 mg (28.3 mg/kg)  Patient's I-pledge # is [id number].     The patient will stop all acne medications     Follow-up in 1 month, earlier for new or changing lesions.     Staff Involved:  Scribe/Staff    Scribe Disclosure:   Kellie BOUDREAUX, am serving as a scribe to document services personally performed by Kiara Mahajan PA-C, based on data collection and the provider's statements to me.    Provider Disclosure:   The documentation recorded by the scribe accurately reflects the services I personally performed and the decisions made by me.    All risks, benefits and alternatives were discussed with patient.  Patient is in agreement and understands the assessment and plan.  All questions were answered.  Sun Screen Education was given.   Return to Clinic in 1 months or sooner as needed.   Kiara Mahajan PA-C   [facility]

## 2019-05-15 NOTE — NURSING NOTE
Dermatology Rooming Note    Jose Enrique Beaver's goals for this visit include:   Chief Complaint   Patient presents with     Derm Problem     Jose Enrique is heere for follow up accutane     Sunshine Beck, CMA

## 2019-05-15 NOTE — PROGRESS NOTES
[facility] Dermatology Note      Dermatology Problem List:  1. Acne vulgaris,   - Initiated isotretinoin 40 mg daily on 3/9/19, increased to 80 mg on 4/10/19. IPledge #[id number], currently at the end of month # 2    Encounter Date: May 15, 2019    CC:  Chief Complaint   Patient presents with     Derm Problem     Jose Enrique sotoere for follow up accutane     History of Present Illness:  Mr. Jose Enrique Beaver is a 23 year old male who presents today in follow up for Accutane. The patient was last seen in the dermatology clinic on 04/10/19 during which his daily dose of isotretinoin was increased to 80 mg. Today he is at the end of month # 2. He reports is skin is doing well. He hardly had acne this month.     The patient reports tolerable mucocutaneous dryness, and denies arthralgias, myalgias, depression, suicidal ideation, diarrhea, headache, or blurred vision.      Past Medical History:   Patient Active Problem List   Diagnosis     Allergy to peanuts     Acne     Past Medical History:   Diagnosis Date     NO ACTIVE PROBLEMS      Past Surgical History:   Procedure Laterality Date     ARTHROSCOPY ANKLE  3/1/2013    Procedure: ARTHROSCOPY ANKLE;  Left ankle arthroscopy Osteochondral dessicans drilling;  Surgeon: Quinton Macias MD;  Location: [place] OR     CIRCUMCISION,CLAMP  1996       Social History:  Patient reports that he has never smoked. He has never used smokeless tobacco. He reports that he does not drink alcohol or use drugs.   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[employer], studying computer science    Family History:  Family History   Problem Relation Age of Onset     Asthma Father      Hypertension Father      Lipids Maternal Grandmother      Gastrointestinal Disease Maternal Grandmother         Non-alcoholic Serosis     Cancer Maternal Grandfather      Lipids Maternal Grandfather      Lipids Paternal Grandmother      Heart Disease Paternal Grandfather      Lipids Paternal Grandfather       Myocardial Infarction Paternal Grandfather      Myocardial Infarction Maternal Aunt         UNDER AGE 50       Medications:  Current Outpatient Medications   Medication Sig Dispense Refill     BENADRYL ALLERGY OR PRN       cetirizine HCl (ZYRTEC ALLERGY) 10 MG CAPS        dexamethasone (DECADRON) 4 MG tablet Take 2 tablets (8 mg) by mouth daily (with breakfast) for 2 days 4 tablet 0     EPINEPHrine (EPIPEN) 0.3 MG/0.3ML injection Inject 0.3 mLs (0.3 mg) into the muscle once as needed for anaphylaxis 2 each 1     ISOtretinoin (ACCUTANE) 40 MG capsule Take 2 capsules (80 mg) by mouth daily with food 60 capsule 0       Allergies   Allergen Reactions     Cat Hair [Cats]      Dogs      Peanuts [Nuts]      Hives, tounge swells         Review of Systems:  -Constitutional: The patient denies fatigue, fevers, chills, unintended weight loss, and night sweats.  -Neuro: no HA or vision changes  -GI: No nausea, blood in stool, diarrhea, hx of IBD  -Psych: no depression/anxiety, mood changes, or sleep problems   -Musculoskeletal: no joint or muscle pain or swelling   -Heme/Lymph: no concerning bumps, no bleeding problems  -Skin: As above in HPI. No additional skin concerns.      Physical exam:  Vitals: There were no vitals taken for this visit.  GEN: This is a well developed, well-nourished male in no acute distress, in a pleasant mood.    SKIN: Waist-up skin, which includes the head/face, neck, both arms, chest, back, abdomen, digits and/or nails was examined.  - There are 1-2 resolving papules throughout the back and posterior shoulders. Scarring through out the back.   -There are a few superficial pustules on the central chest, with scarring noted to upper chest   -There are 3-4 cystic papules on his left and right medial upper arms.   -No other lesions of concern on areas examined.       Impression/Plan:  1. Acne vulgaris, on isotretinoin- at the end of month # 2   Reminded pt of side effects, including dryness. Advised  regular use of emollients.  Recommend taking medication with a food containing fat.    At this visit, we will increase daily dose of Accutane to 80mg daily. Discussed increased side effects associated with increased dose.  One month supply with no refills provided.  Goal dose is 19,050 to 27,940 mg for 150-220 mg/kg dosing in this 127 kg patient.    Labs to be obtained today: CBC, BUN/Cr, fasting lipids will be obtained. Pt is fasting today. His triglycerides were elevated.   Continue daily fish oil supplement. Hx elevated triglycerides. Continued elevated triglycerides. Hold dose of 80 mg daily.     Total cumulative dose 3,600 mg (28.3 mg/kg)  Patient's I-pledge # is [id number].     The patient will stop all acne medications     Follow-up in 1 month, earlier for new or changing lesions.     Staff Involved:  Scribe/Staff    Scribe Disclosure:   Kellie BOUDREAUX, am serving as a scribe to document services personally performed by Kiara Mahajan PA-C, based on data collection and the provider's statements to me.    Provider Disclosure:   The documentation recorded by the scribe accurately reflects the services I personally performed and the decisions made by me.    All risks, benefits and alternatives were discussed with patient.  Patient is in agreement and understands the assessment and plan.  All questions were answered.  Sun Screen Education was given.   Return to Clinic in 1 months or sooner as needed.   Kiara Mahajan PA-C   [facility]

## 2019-06-12 ENCOUNTER — OFFICE VISIT (OUTPATIENT)
Dept: DERMATOLOGY | Facility: CLINIC | Age: 24
End: 2019-06-12
Payer: COMMERCIAL

## 2019-06-12 DIAGNOSIS — L70.0 ACNE VULGARIS: ICD-10-CM

## 2019-06-12 DIAGNOSIS — Z79.899 ON ISOTRETINOIN THERAPY: ICD-10-CM

## 2019-06-12 DIAGNOSIS — L70.0 ACNE VULGARIS: Primary | ICD-10-CM

## 2019-06-12 LAB
ALT SERPL W P-5'-P-CCNC: 63 U/L (ref 0–70)
AST SERPL W P-5'-P-CCNC: 29 U/L (ref 0–45)
TRIGL SERPL-MCNC: 248 MG/DL

## 2019-06-12 RX ORDER — ISOTRETINOIN 40 MG/1
120 CAPSULE ORAL
Qty: 90 CAPSULE | Refills: 0 | Status: SHIPPED | OUTPATIENT
Start: 2019-06-12 | End: 2019-07-18

## 2019-06-12 ASSESSMENT — PAIN SCALES - GENERAL: PAINLEVEL: NO PAIN (0)

## 2019-06-12 NOTE — PROGRESS NOTES
[facility] Dermatology Note      Dermatology Problem List:  1. Acne vulgaris,   - Initiated isotretinoin 40 mg daily on 3/9/19, increased to 80 mg on 4/10/19. IPledge #[id number], currently at the end of month #3    Encounter Date: Jun 12, 2019    CC:  Chief Complaint   Patient presents with     Accutane     Oneil is here today for a follow up for accutane. He states that he just has the normal SE or dryness, sore back and neck but that is it.      History of Present Illness:  Mr. Jose Enrique Beaver is a 23 year old male who presents today in follow up for Accutane. The patient was last seen on 5/15/19 when his dose of isotretinoin was increased to 80 mg olga lidia. Today he is at the end of month # 3. His skin has continued to improve with the medication. He still has normal side effects such as dryness of the skin and lips. He has a sore neck and back but is unsure if this is related. The patient reports tolerable mucocutaneous dryness, and denies arthralgias, myalgias, depression, suicidal ideation, diarrhea, headache, or blurred vision. No other skin concerns at this time.       Past Medical History:   Patient Active Problem List   Diagnosis     Allergy to peanuts     Acne     Past Medical History:   Diagnosis Date     NO ACTIVE PROBLEMS      Past Surgical History:   Procedure Laterality Date     ARTHROSCOPY ANKLE  3/1/2013    Procedure: ARTHROSCOPY ANKLE;  Left ankle arthroscopy Osteochondral dessicans drilling;  Surgeon: Quinton Macias MD;  Location: [place] OR     [facility]  1996       Social History:  Patient reports that he has never smoked. He has never used smokeless tobacco. He reports that he does not drink alcohol or use drugs.   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[employer], studying computer science    Family History:  Family History   Problem Relation Age of Onset     Asthma Father      Hypertension Father      Lipids Maternal Grandmother      Gastrointestinal Disease Maternal  Grandmother         Non-alcoholic Serosis     Cancer Maternal Grandfather      Lipids Maternal Grandfather      Lipids Paternal Grandmother      Heart Disease Paternal Grandfather      Lipids Paternal Grandfather      Myocardial Infarction Paternal Grandfather      Myocardial Infarction Maternal Aunt         UNDER AGE 50       Medications:  Current Outpatient Medications   Medication Sig Dispense Refill     BENADRYL ALLERGY OR PRN       cetirizine HCl (ZYRTEC ALLERGY) 10 MG CAPS        EPINEPHrine (EPIPEN) 0.3 MG/0.3ML injection Inject 0.3 mLs (0.3 mg) into the muscle once as needed for anaphylaxis 2 each 1     ISOtretinoin (ACCUTANE) 40 MG capsule Take 2 capsules (80 mg) by mouth daily with food 60 capsule 0     dexamethasone (DECADRON) 4 MG tablet Take 2 tablets (8 mg) by mouth daily (with breakfast) for 2 days 4 tablet 0       Allergies   Allergen Reactions     Cat Hair [Cats]      Dogs      Peanuts [Nuts]      Hives, tounge swells         Review of Systems:  -Constitutional: The patient denies fatigue, fevers, chills, unintended weight loss, and night sweats.  -Neuro: no HA or vision changes  -GI: No nausea, blood in stool, diarrhea, hx of IBD  -Psych: no depression/anxiety, mood changes, or sleep problems   -Musculoskeletal: no joint or muscle pain or swelling   -Heme/Lymph: no concerning bumps, no bleeding problems  -Skin: As above in HPI. No additional skin concerns.    Physical exam:  Vitals: There were no vitals taken for this visit.  GEN: This is a well developed, well-nourished male in no acute distress, in a pleasant mood.    SKIN: Waist-up skin, which includes the head/face, neck, both arms, chest, back, abdomen, digits and/or nails was examined.  - One resolving inflammatory papule on the right lateral back.   - Nummular appearing pink plaques on the anterior shoulders and upper arms.   - Three resolving inflammatory papules on the right posterior neck and another on the left lower chest.   - Scarring  throughout the back.  - No other lesions of concern on areas examined.       Impression/Plan:  1. Acne vulgaris, on isotretinoin- at the end of month # 3  Reminded pt of side effects, including dryness. Advised regular use of emollients.  Recommend taking medication with a food containing fat.    At this visit, we will increase daily dose of Accutane to 120 mg daily.   One month supply with no refills provided.  Goal dose is 19,050 to 27,940 mg for 150-220 mg/kg dosing in this 127 kg patient.      Labs to be obtained today: AST/ALT, triglycerides will be obtained.     Continue daily fish oil supplement. Hx elevated triglycerides.     Total cumulative dose 6,000 mg (47.2 mg/kg)  Patient's I-pledge # is [id number].     The patient will stop all acne medications     2. Retinoid dermatitis     Patient defers treatment. Continue daily use of emollients.     Follow-up in 1 month, earlier for new or changing lesions.     Staff Involved:  Staff Only    Scribe Disclosure:  I, Dominick Zhou, am serving as a scribe to document services personally performed by Kiara Mahajan PA-C, based on data collection and the provider's statements to me.     Provider Disclosure:   The documentation recorded by the scribe accurately reflects the services I personally performed and the decisions made by me.    All risks, benefits and alternatives were discussed with patient.  Patient is in agreement and understands the assessment and plan.  All questions were answered.  Sun Screen Education was given.   Return to Clinic in 1 month or sooner as needed.   Kiara Mahajan PA-C   [facility] Dermatology Clinic

## 2019-06-12 NOTE — NURSING NOTE
Chief Complaint   Patient presents with     Accutane     Oneil is here today for a follow up for accutane. He states that he just has the normal SE or dryness, sore back and neck but that is it.      Geneva Delgado, CMA

## 2019-06-12 NOTE — LETTER
6/12/2019       RE: Jose Enrique Beaver  [address] [place] [address]     Dear Colleague,    Thank you for referring your patient, Jose Enrique Beaver, to the [facility] DERMATOLOGY at [facility]. Please see a copy of my visit note below.    Confirmed in ipledge 6/12/2019.    [facility] Dermatology Note      Dermatology Problem List:  1. Acne vulgaris,   - Initiated isotretinoin 40 mg daily on 3/9/19, increased to 80 mg on 4/10/19. IPledge #[id number], currently at the end of month #3    Encounter Date: Jun 12, 2019    CC:  Chief Complaint   Patient presents with     Accutane     Oneil is here today for a follow up for accutane. He states that he just has the normal SE or dryness, sore back and neck but that is it.      History of Present Illness:  Mr. Jose Enrique Beaver is a 23 year old male who presents today in follow up for Accutane. The patient was last seen on 5/15/19 when his dose of isotretinoin was increased to 80 mg olga lidia. Today he is at the end of month # 3. His skin has continued to improve with the medication. He still has normal side effects such as dryness of the skin and lips. He has a sore neck and back but is unsure if this is related. The patient reports tolerable mucocutaneous dryness, and denies arthralgias, myalgias, depression, suicidal ideation, diarrhea, headache, or blurred vision. No other skin concerns at this time.       Past Medical History:   Patient Active Problem List   Diagnosis     Allergy to peanuts     Acne     Past Medical History:   Diagnosis Date     NO ACTIVE PROBLEMS      Past Surgical History:   Procedure Laterality Date     ARTHROSCOPY ANKLE  3/1/2013    Procedure: ARTHROSCOPY ANKLE;  Left ankle arthroscopy Osteochondral dessicans drilling;  Surgeon: Quinton Macias MD;  Location: [place] OR     [facility],CLAMP  1996       Social History:  Patient reports that he has never smoked. He has never used smokeless tobacco.  He reports that he does not drink alcohol or use drugs.   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[employer], studying computer science    Family History:  Family History   Problem Relation Age of Onset     Asthma Father      Hypertension Father      Lipids Maternal Grandmother      Gastrointestinal Disease Maternal Grandmother         Non-alcoholic Serosis     Cancer Maternal Grandfather      Lipids Maternal Grandfather      Lipids Paternal Grandmother      Heart Disease Paternal Grandfather      Lipids Paternal Grandfather      Myocardial Infarction Paternal Grandfather      Myocardial Infarction Maternal Aunt         UNDER AGE 50       Medications:  Current Outpatient Medications   Medication Sig Dispense Refill     BENADRYL ALLERGY OR PRN       cetirizine HCl (ZYRTEC ALLERGY) 10 MG CAPS        EPINEPHrine (EPIPEN) 0.3 MG/0.3ML injection Inject 0.3 mLs (0.3 mg) into the muscle once as needed for anaphylaxis 2 each 1     ISOtretinoin (ACCUTANE) 40 MG capsule Take 2 capsules (80 mg) by mouth daily with food 60 capsule 0     dexamethasone (DECADRON) 4 MG tablet Take 2 tablets (8 mg) by mouth daily (with breakfast) for 2 days 4 tablet 0       Allergies   Allergen Reactions     Cat Hair [Cats]      Dogs      Peanuts [Nuts]      Hives, tounge swells         Review of Systems:  -Constitutional: The patient denies fatigue, fevers, chills, unintended weight loss, and night sweats.  -Neuro: no HA or vision changes  -GI: No nausea, blood in stool, diarrhea, hx of IBD  -Psych: no depression/anxiety, mood changes, or sleep problems   -Musculoskeletal: no joint or muscle pain or swelling   -Heme/Lymph: no concerning bumps, no bleeding problems  -Skin: As above in HPI. No additional skin concerns.    Physical exam:  Vitals: There were no vitals taken for this visit.  GEN: This is a well developed, well-nourished male in no acute distress, in a pleasant mood.    SKIN: Waist-up skin, which includes the head/face, neck, both arms, chest,  back, abdomen, digits and/or nails was examined.  - One resolving inflammatory papule on the right lateral back.   - Nummular appearing pink plaques on the anterior shoulders and upper arms.   - Three resolving inflammatory papules on the right posterior neck and another on the left lower chest.   - Scarring throughout the back.  - No other lesions of concern on areas examined.       Impression/Plan:  1. Acne vulgaris, on isotretinoin- at the end of month # 3  Reminded pt of side effects, including dryness. Advised regular use of emollients.  Recommend taking medication with a food containing fat.    At this visit, we will increase daily dose of Accutane to 120 mg daily.   One month supply with no refills provided.  Goal dose is 19,050 to 27,940 mg for 150-220 mg/kg dosing in this 127 kg patient.      Labs to be obtained today: AST/ALT, triglycerides will be obtained.     Continue daily fish oil supplement. Hx elevated triglycerides.     Total cumulative dose 6,000 mg (47.2 mg/kg)  Patient's I-pledge # is [id number].     The patient will stop all acne medications     2. Retinoid dermatitis     Patient defers treatment. Continue daily use of emollients.     Follow-up in 1 month, earlier for new or changing lesions.     Staff Involved:  Staff Only    Scribe Disclosure:  I, Dominick Zhou, am serving as a scribe to document services personally performed by Kiara Mahajan PA-C, based on data collection and the provider's statements to me.     Provider Disclosure:   The documentation recorded by the scribe accurately reflects the services I personally performed and the decisions made by me.    All risks, benefits and alternatives were discussed with patient.  Patient is in agreement and understands the assessment and plan.  All questions were answered.  Sun Screen Education was given.   Return to Clinic in 1 month or sooner as needed.   Kiara Mahajan PA-C   [facility] Dermatology Clinic

## 2019-07-17 ENCOUNTER — OFFICE VISIT (OUTPATIENT)
Dept: DERMATOLOGY | Facility: CLINIC | Age: 24
End: 2019-07-17
Payer: COMMERCIAL

## 2019-07-17 DIAGNOSIS — L70.0 ACNE VULGARIS: ICD-10-CM

## 2019-07-17 DIAGNOSIS — Z79.899 ON ISOTRETINOIN THERAPY: ICD-10-CM

## 2019-07-17 DIAGNOSIS — L70.0 ACNE VULGARIS: Primary | ICD-10-CM

## 2019-07-17 LAB
ALT SERPL W P-5'-P-CCNC: 66 U/L (ref 0–70)
AST SERPL W P-5'-P-CCNC: 39 U/L (ref 0–45)
TRIGL SERPL-MCNC: 487 MG/DL

## 2019-07-17 ASSESSMENT — PAIN SCALES - GENERAL: PAINLEVEL: NO PAIN (0)

## 2019-07-17 NOTE — NURSING NOTE
Dermatology Rooming Note    Jose Enrique Beaver's goals for this visit include:   Chief Complaint   Patient presents with     Derm Problem     Accutane follow up, Oneil states he is doiong well.      Mona Garay LPN

## 2019-07-17 NOTE — PROGRESS NOTES
[facility] Dermatology Note      Dermatology Problem List:  1. Acne vulgaris,   - Initiated isotretinoin 40 mg daily on 3/9/19, increased to 80 mg on 4/10/19. IPledge #[id number], currently at the end of month #3    Encounter Date: Jul 17, 2019    CC:  Chief Complaint   Patient presents with     Derm Problem     Accutane follow up, Oneil states he is doiong well.      History of Present Illness:  Mr. Jose Enrique Beaver is a 23 year old male who presents today in follow up for Accutane. The patient was last seen on 6/12/19 when his dose of isotretinoin was increased from 80mg daily to 120 mg daily.     Today he is at the end of month #4. His skin has continued to improve with the medication. He has not experienced any breakouts on the face, chest, or back in the last month. He reports that his acne has become more superficial if not clearing entirely. He still has normal side effects such as dryness of the skin and lips. He received moderate sun exposure last week.    Otherwise, the patient reports tolerable mucocutaneous dryness, and denies arthralgias, myalgias, depression, suicidal ideation, diarrhea, headache, or blurred vision. No other skin concerns at this time.       Past Medical History:   Patient Active Problem List   Diagnosis     Allergy to peanuts     Acne     Past Medical History:   Diagnosis Date     NO ACTIVE PROBLEMS      Past Surgical History:   Procedure Laterality Date     ARTHROSCOPY ANKLE  3/1/2013    Procedure: ARTHROSCOPY ANKLE;  Left ankle arthroscopy Osteochondral dessicans drilling;  Surgeon: Quinton Macias MD;  Location: [place] OR     [facility]UM[facility]ISION,CLAMP  1996       Social History:  Patient reports that he has never smoked. He has never used smokeless tobacco. He reports that he does not drink alcohol or use drugs.   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[employer], studying computer science    Family History:  Family History   Problem Relation Age of Onset     Asthma Father       Hypertension Father      Lipids Maternal Grandmother      Gastrointestinal Disease Maternal Grandmother         Non-alcoholic Serosis     Cancer Maternal Grandfather      Lipids Maternal Grandfather      Lipids Paternal Grandmother      Heart Disease Paternal Grandfather      Lipids Paternal Grandfather      Myocardial Infarction Paternal Grandfather      Myocardial Infarction Maternal Aunt         UNDER AGE 50       Medications:  Current Outpatient Medications   Medication Sig Dispense Refill     BENADRYL ALLERGY OR PRN       cetirizine HCl (ZYRTEC ALLERGY) 10 MG CAPS        dexamethasone (DECADRON) 4 MG tablet Take 2 tablets (8 mg) by mouth daily (with breakfast) for 2 days 4 tablet 0     EPINEPHrine (EPIPEN) 0.3 MG/0.3ML injection Inject 0.3 mLs (0.3 mg) into the muscle once as needed for anaphylaxis 2 each 1     ISOtretinoin (ACCUTANE) 40 MG capsule Take 3 capsules (120 mg) by mouth daily with food 90 capsule 0       Allergies   Allergen Reactions     Cat Hair [Cats]      Dogs      Peanuts [Nuts]      Hives, tounge swells         Review of Systems:  -Constitutional: The patient denies fatigue, fevers, chills, unintended weight loss, and night sweats.  -Neuro: no HA or vision changes  -GI: No nausea, blood in stool, diarrhea, hx of IBD  -Psych: no depression/anxiety, mood changes, or sleep problems   -Musculoskeletal: no joint or muscle pain or swelling   -Heme/Lymph: no concerning bumps, no bleeding problems  -Skin: As above in HPI. No additional skin concerns.    Physical exam:  Vitals: There were no vitals taken for this visit.  GEN: This is a well developed, well-nourished male in no acute distress, in a pleasant mood.    SKIN: Waist-up skin, which includes the head/face, neck, both arms, chest, back, abdomen, digits and/or nails was examined.  - No comedones to the face  - small acneforms on the upper back  - Scarring throughout the back.  - No other lesions of concern on areas examined.        Impression/Plan:  1. Acne vulgaris, on isotretinoin- at the end of month # 4  Reminded pt of side effects, including dryness. Advised regular use of emollients.  Recommend taking medication with a food containing fat.   At this visit, we will continue a daily dose of Accutane to 120 mg daily after reducing to 80 mg for a week if labs improve.   His triglycerides were markedly elevated (487). Pt had poor eating habits over the weekend. Will reduce to 80 mg for the next week and encouraged good eating habits. Continue fish oil supplement bid. Will recheck triglycerides in 7-10 days.   One month supply with no refills provided.  Goal dose is 19,050 to 27,940 mg for 150-220 mg/kg dosing in this 127 kg patient.      Labs to be obtained today: AST/ALT, triglycerides will be obtained.     Continue daily fish oil supplement. Hx elevated triglycerides.     Total cumulative dose 9,600 mg (75.6 mg/kg)  Patient's I-pledge # is [id number].     The patient will stop all acne medications         Follow-up in 1 month, earlier for new or changing lesions.     Staff Involved:  Scribe/Staff    Scribe Disclosure:   Carroll BOUDREAUX, am serving as a scribe to document services personally performed by Kiara Mahajan PA-C, based on data collection and the provider's statements to me.    Provider Disclosure:   The documentation recorded by the scribe accurately reflects the services I personally performed and the decisions made by me.    All risks, benefits and alternatives were discussed with patient.  Patient is in agreement and understands the assessment and plan.  All questions were answered.  Sun Screen Education was given.   Return to Clinic in 1 month or sooner as needed.   Kiara Mahajan PA-C   [facility] Dermatology Clinic

## 2019-07-17 NOTE — LETTER
7/17/2019       RE: Jose Enrique Beaver  [address] [place] [address]     Dear Colleague,    Thank you for referring your patient, Jose Enrique Beaver, to the [facility] DERMATOLOGY at [facility]. Please see a copy of my visit note below.    [facility] Dermatology Note      Dermatology Problem List:  1. Acne vulgaris,   - Initiated isotretinoin 40 mg daily on 3/9/19, increased to 80 mg on 4/10/19. IPledge #[id number], currently at the end of month #3    Encounter Date: Jul 17, 2019    CC:  Chief Complaint   Patient presents with     Derm Problem     Accutane follow up, Oneil states he is doiong well.      History of Present Illness:  Mr. Jose Enrique Beaver is a 23 year old male who presents today in follow up for Accutane. The patient was last seen on 6/12/19 when his dose of isotretinoin was increased from 80mg daily to 120 mg daily.     Today he is at the end of month #4. His skin has continued to improve with the medication. He has not experienced any breakouts on the face, chest, or back in the last month. He reports that his acne has become more superficial if not clearing entirely. He still has normal side effects such as dryness of the skin and lips. He received moderate sun exposure last week.    Otherwise, the patient reports tolerable mucocutaneous dryness, and denies arthralgias, myalgias, depression, suicidal ideation, diarrhea, headache, or blurred vision. No other skin concerns at this time.       Past Medical History:   Patient Active Problem List   Diagnosis     Allergy to peanuts     Acne     Past Medical History:   Diagnosis Date     NO ACTIVE PROBLEMS      Past Surgical History:   Procedure Laterality Date     ARTHROSCOPY ANKLE  3/1/2013    Procedure: ARTHROSCOPY ANKLE;  Left ankle arthroscopy Osteochondral dessicans drilling;  Surgeon: Quinton Macias MD;  Location: [place] OR     C[facility]CUM[facility]ISION,CLAMP  1996       Social History:  Patient  reports that he has never smoked. He has never used smokeless tobacco. He reports that he does not drink alcohol or use drugs.   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[employer], studying computer science    Family History:  Family History   Problem Relation Age of Onset     Asthma Father      Hypertension Father      Lipids Maternal Grandmother      Gastrointestinal Disease Maternal Grandmother         Non-alcoholic Serosis     Cancer Maternal Grandfather      Lipids Maternal Grandfather      Lipids Paternal Grandmother      Heart Disease Paternal Grandfather      Lipids Paternal Grandfather      Myocardial Infarction Paternal Grandfather      Myocardial Infarction Maternal Aunt         UNDER AGE 50       Medications:  Current Outpatient Medications   Medication Sig Dispense Refill     BENADRYL ALLERGY OR PRN       cetirizine HCl (ZYRTEC ALLERGY) 10 MG CAPS        dexamethasone (DECADRON) 4 MG tablet Take 2 tablets (8 mg) by mouth daily (with breakfast) for 2 days 4 tablet 0     EPINEPHrine (EPIPEN) 0.3 MG/0.3ML injection Inject 0.3 mLs (0.3 mg) into the muscle once as needed for anaphylaxis 2 each 1     ISOtretinoin (ACCUTANE) 40 MG capsule Take 3 capsules (120 mg) by mouth daily with food 90 capsule 0       Allergies   Allergen Reactions     Cat Hair [Cats]      Dogs      Peanuts [Nuts]      Hives, tounge swells         Review of Systems:  -Constitutional: The patient denies fatigue, fevers, chills, unintended weight loss, and night sweats.  -Neuro: no HA or vision changes  -GI: No nausea, blood in stool, diarrhea, hx of IBD  -Psych: no depression/anxiety, mood changes, or sleep problems   -Musculoskeletal: no joint or muscle pain or swelling   -Heme/Lymph: no concerning bumps, no bleeding problems  -Skin: As above in HPI. No additional skin concerns.    Physical exam:  Vitals: There were no vitals taken for this visit.  GEN: This is a well developed, well-nourished male in no acute distress, in a pleasant mood.    SKIN:  Waist-up skin, which includes the head/face, neck, both arms, chest, back, abdomen, digits and/or nails was examined.  - No comedones to the face  - small acneforms on the upper back  - Scarring throughout the back.  - No other lesions of concern on areas examined.       Impression/Plan:  1. Acne vulgaris, on isotretinoin- at the end of month # 4  Reminded pt of side effects, including dryness. Advised regular use of emollients.  Recommend taking medication with a food containing fat.   At this visit, we will continue a daily dose of Accutane to 120 mg daily after reducing to 80 mg for a week if labs improve.   His triglycerides were markedly elevated (487). Pt had poor eating habits over the weekend. Will reduce to 80 mg for the next week and encouraged good eating habits. Continue fish oil supplement bid. Will recheck triglycerides in 7-10 days.   One month supply with no refills provided.  Goal dose is 19,050 to 27,940 mg for 150-220 mg/kg dosing in this 127 kg patient.      Labs to be obtained today: AST/ALT, triglycerides will be obtained.     Continue daily fish oil supplement. Hx elevated triglycerides.     Total cumulative dose 9,600 mg (75.6 mg/kg)  Patient's I-pledge # is [id number].     The patient will stop all acne medications         Follow-up in 1 month, earlier for new or changing lesions.     Staff Involved:  Scribe/Staff    Scribe Disclosure:   Carroll BOUDREAUX, am serving as a scribe to document services personally performed by Kiara Mahajan PA-C, based on data collection and the provider's statements to me.    Provider Disclosure:   The documentation recorded by the scribe accurately reflects the services I personally performed and the decisions made by me.    All risks, benefits and alternatives were discussed with patient.  Patient is in agreement and understands the assessment and plan.  All questions were answered.  Sun Screen Education was given.   Return to Clinic in 1 month or sooner as  needed.   Kiara Mahajan PA-C   [facility] Dermatology Clinic

## 2019-07-18 RX ORDER — ISOTRETINOIN 40 MG/1
120 CAPSULE ORAL
Qty: 90 CAPSULE | Refills: 0 | Status: SHIPPED | OUTPATIENT
Start: 2019-07-18 | End: 2019-08-14

## 2019-07-26 DIAGNOSIS — E78.1 HYPERTRIGLYCERIDEMIA: Primary | ICD-10-CM

## 2019-07-26 DIAGNOSIS — L70.0 ACNE VULGARIS: ICD-10-CM

## 2019-07-26 DIAGNOSIS — Z79.899 ON ISOTRETINOIN THERAPY: ICD-10-CM

## 2019-07-26 LAB — TRIGL SERPL-MCNC: 425 MG/DL

## 2019-07-26 RX ORDER — OMEGA-3S/DHA/EPA/FISH OIL/D3 300MG-1000
4 CAPSULE ORAL DAILY
Qty: 120 CAPSULE | Refills: 5 | Status: SHIPPED | OUTPATIENT
Start: 2019-07-26

## 2019-08-14 ENCOUNTER — OFFICE VISIT (OUTPATIENT)
Dept: DERMATOLOGY | Facility: CLINIC | Age: 24
End: 2019-08-14
Payer: COMMERCIAL

## 2019-08-14 DIAGNOSIS — Z79.899 ON ISOTRETINOIN THERAPY: ICD-10-CM

## 2019-08-14 DIAGNOSIS — L70.0 ACNE VULGARIS: ICD-10-CM

## 2019-08-14 DIAGNOSIS — L70.0 ACNE VULGARIS: Primary | ICD-10-CM

## 2019-08-14 LAB
ALT SERPL W P-5'-P-CCNC: 63 U/L (ref 0–70)
AST SERPL W P-5'-P-CCNC: 32 U/L (ref 0–45)
TRIGL SERPL-MCNC: 412 MG/DL

## 2019-08-14 RX ORDER — ISOTRETINOIN 40 MG/1
80 CAPSULE ORAL
Qty: 60 CAPSULE | Refills: 0 | Status: SHIPPED | OUTPATIENT
Start: 2019-08-14 | End: 2019-09-18

## 2019-08-14 ASSESSMENT — PAIN SCALES - GENERAL: PAINLEVEL: NO PAIN (0)

## 2019-08-14 NOTE — NURSING NOTE
Dermatology Rooming Note    Jose Enrique Beaver's goals for this visit include:   Chief Complaint   Patient presents with     Derm Problem     Accutane, Oneil notes his tx is going well.      Mona Garay LPN

## 2019-08-14 NOTE — LETTER
8/14/2019       RE: Jose Enrique Beaver  [address] [place] [address]     Dear Colleague,    Thank you for referring your patient, Jose Enrique Beaver, to the [facility] DERMATOLOGY at [facility]. Please see a copy of my visit note below.    [facility] Dermatology Note      Dermatology Problem List:  1. Acne vulgaris,   - Initiated isotretinoin 40 mg daily on 3/9/19, increased to 80 mg on 4/10/19. IPledge #[id number], currently at the end of month #5    Encounter Date: Aug 14, 2019    CC:  Chief Complaint   Patient presents with     Derm Problem     Accutane, Oneil notes his tx is going well.      History of Present Illness:  Mr. Jose Enrique Beaver is a 23 year old male who presents today in follow up for Accutane. The patient was last seen on 7/17/19 when his dose of isotretinoin was lowered to 80mg daily due to elevated triglycerides.     Today he is at the end of month #5. His skin has continued to improve with the medication. He has not experienced any breakouts on the face, chest, or back in the last month similar to last month. He reports that his acne has become more superficial and has continued to clear. He still has normal side effects such as dryness of the skin and lips. He has not received significant sun exposure in the last month.    Otherwise, the patient reports tolerable mucocutaneous dryness, and denies arthralgias, myalgias, depression, suicidal ideation, diarrhea, headache, or blurred vision. No other skin concerns at this time.       Past Medical History:   Patient Active Problem List   Diagnosis     Allergy to peanuts     Acne     Past Medical History:   Diagnosis Date     NO ACTIVE PROBLEMS      Past Surgical History:   Procedure Laterality Date     ARTHROSCOPY ANKLE  3/1/2013    Procedure: ARTHROSCOPY ANKLE;  Left ankle arthroscopy Osteochondral dessicans drilling;  Surgeon: Quinton Macias MD;  Location: [place] OR     CIRCUMCISION,CLAMP   1996       Social History:  Patient reports that he has never smoked. He has never used smokeless tobacco. He reports that he does not drink alcohol or use drugs.   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[employer], studying computer science    Family History:  Family History   Problem Relation Age of Onset     Asthma Father      Hypertension Father      Lipids Maternal Grandmother      Gastrointestinal Disease Maternal Grandmother         Non-alcoholic Serosis     Cancer Maternal Grandfather      Lipids Maternal Grandfather      Lipids Paternal Grandmother      Heart Disease Paternal Grandfather      Lipids Paternal Grandfather      Myocardial Infarction Paternal Grandfather      Myocardial Infarction Maternal Aunt         UNDER AGE 50       Medications:  Current Outpatient Medications   Medication Sig Dispense Refill     BENADRYL ALLERGY OR PRN       cetirizine HCl (ZYRTEC ALLERGY) 10 MG CAPS        dexamethasone (DECADRON) 4 MG tablet Take 2 tablets (8 mg) by mouth daily (with breakfast) for 2 days 4 tablet 0     EPINEPHrine (EPIPEN) 0.3 MG/0.3ML injection Inject 0.3 mLs (0.3 mg) into the muscle once as needed for anaphylaxis 2 each 1     ISOtretinoin (ACCUTANE) 40 MG capsule Take 3 capsules (120 mg) by mouth daily with food 90 capsule 0     Omega-3 Fatty Acids (FISH OIL BURP-LESS) 1000 MG CAPS Take 4 g by mouth daily 120 capsule 5       Allergies   Allergen Reactions     Cat Hair [Cats]      Dogs      Peanuts [Nuts]      Hives, tounge swells         Review of Systems:  -Constitutional: The patient denies fatigue, fevers, chills, unintended weight loss, and night sweats.  -Neuro: no HA or vision changes  -GI: No nausea, blood in stool, diarrhea, hx of IBD  -Psych: no depression/anxiety, mood changes, or sleep problems   -Musculoskeletal: no joint or muscle pain or swelling   -Heme/Lymph: no concerning bumps, no bleeding problems  -Skin: As above in HPI. No additional skin concerns.    Physical exam:  Vitals: There  were no vitals taken for this visit.  GEN: This is a well developed, well-nourished male in no acute distress, in a pleasant mood.    SKIN: Waist-up skin, which includes the head/face, neck, both arms, chest, back, abdomen, digits and/or nails was examined.  - No comedones to the face  - No acneforms on the upper back or chest  - Scarring throughout the back.  - No other lesions of concern on areas examined.       Impression/Plan:  1. Acne vulgaris, on isotretinoin- at the end of month # 5  Reminded pt of side effects, including dryness. Advised regular use of emollients.  Recommend taking medication with a food containing fat.   At this visit, we will continue a daily dose of Accutane at 80 mg daily   His triglycerides were markedly elevated in the past (487). Encouraged good eating habits with fish oil supplement bid. Will recheck triglycerides today, 412.  One month supply with no refills provided.  Goal dose is 19,050 to 27,940 mg for 150-220 mg/kg dosing in this 127 kg patient.      Labs to be obtained today: AST/ALT, triglycerides will be obtained.     Continue daily fish oil supplement. Hx elevated triglycerides.     Total cumulative dose 12,000 mg (94.5 mg/kg)  Patient's I-pledge # is [id number].     The patient will stop all acne medications         Follow-up in 1 month, earlier for new or changing lesions.     Staff Involved:  Scribe/Staff    Scribe Disclosure:   Carroll BOUDREAUX, am serving as a scribe to document services personally performed by Kiara Mahajan PA-C, based on data collection and the provider's statements to me.    Provider Disclosure:   The documentation recorded by the scribe accurately reflects the services I personally performed and the decisions made by me.    All risks, benefits and alternatives were discussed with patient.  Patient is in agreement and understands the assessment and plan.  All questions were answered.  Sun Screen Education was given.   Return to Clinic in 1 month  or sooner as needed.   Kiara Mahajan PA-C   [facility]

## 2019-08-14 NOTE — PROGRESS NOTES
[facility] Dermatology Note      Dermatology Problem List:  1. Acne vulgaris,   - Initiated isotretinoin 40 mg daily on 3/9/19, increased to 80 mg on 4/10/19. IPledge #[id number], currently at the end of month #5    Encounter Date: Aug 14, 2019    CC:  Chief Complaint   Patient presents with     Derm Problem     Accutane, Oneil notes his tx is going well.      History of Present Illness:  Mr. Jose Enrique Beaver is a 23 year old male who presents today in follow up for Accutane. The patient was last seen on 7/17/19 when his dose of isotretinoin was lowered to 80mg daily due to elevated triglycerides.     Today he is at the end of month #5. His skin has continued to improve with the medication. He has not experienced any breakouts on the face, chest, or back in the last month similar to last month. He reports that his acne has become more superficial and has continued to clear. He still has normal side effects such as dryness of the skin and lips. He has not received significant sun exposure in the last month.    Otherwise, the patient reports tolerable mucocutaneous dryness, and denies arthralgias, myalgias, depression, suicidal ideation, diarrhea, headache, or blurred vision. No other skin concerns at this time.       Past Medical History:   Patient Active Problem List   Diagnosis     Allergy to peanuts     Acne     Past Medical History:   Diagnosis Date     NO ACTIVE PROBLEMS      Past Surgical History:   Procedure Laterality Date     ARTHROSCOPY ANKLE  3/1/2013    Procedure: ARTHROSCOPY ANKLE;  Left ankle arthroscopy Osteochondral dessicans drilling;  Surgeon: Quinton Macias MD;  Location: [place] OR     CIRCUMCISION,CLAMP  1996       Social History:  Patient reports that he has never smoked. He has never used smokeless tobacco. He reports that he does not drink alcohol or use drugs.   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[employer], studying computer science    Family History:  Family History    Problem Relation Age of Onset     Asthma Father      Hypertension Father      Lipids Maternal Grandmother      Gastrointestinal Disease Maternal Grandmother         Non-alcoholic Serosis     Cancer Maternal Grandfather      Lipids Maternal Grandfather      Lipids Paternal Grandmother      Heart Disease Paternal Grandfather      Lipids Paternal Grandfather      Myocardial Infarction Paternal Grandfather      Myocardial Infarction Maternal Aunt         UNDER AGE 50       Medications:  Current Outpatient Medications   Medication Sig Dispense Refill     BENADRYL ALLERGY OR PRN       cetirizine HCl (ZYRTEC ALLERGY) 10 MG CAPS        dexamethasone (DECADRON) 4 MG tablet Take 2 tablets (8 mg) by mouth daily (with breakfast) for 2 days 4 tablet 0     EPINEPHrine (EPIPEN) 0.3 MG/0.3ML injection Inject 0.3 mLs (0.3 mg) into the muscle once as needed for anaphylaxis 2 each 1     ISOtretinoin (ACCUTANE) 40 MG capsule Take 3 capsules (120 mg) by mouth daily with food 90 capsule 0     Omega-3 Fatty Acids (FISH OIL BURP-LESS) 1000 MG CAPS Take 4 g by mouth daily 120 capsule 5       Allergies   Allergen Reactions     Cat Hair [Cats]      Dogs      Peanuts [Nuts]      Hives, tounge swells         Review of Systems:  -Constitutional: The patient denies fatigue, fevers, chills, unintended weight loss, and night sweats.  -Neuro: no HA or vision changes  -GI: No nausea, blood in stool, diarrhea, hx of IBD  -Psych: no depression/anxiety, mood changes, or sleep problems   -Musculoskeletal: no joint or muscle pain or swelling   -Heme/Lymph: no concerning bumps, no bleeding problems  -Skin: As above in HPI. No additional skin concerns.    Physical exam:  Vitals: There were no vitals taken for this visit.  GEN: This is a well developed, well-nourished male in no acute distress, in a pleasant mood.    SKIN: Waist-up skin, which includes the head/face, neck, both arms, chest, back, abdomen, digits and/or nails was examined.  - No comedones  to the face  - No acneforms on the upper back or chest  - Scarring throughout the back.  - No other lesions of concern on areas examined.       Impression/Plan:  1. Acne vulgaris, on isotretinoin- at the end of month # 5  Reminded pt of side effects, including dryness. Advised regular use of emollients.  Recommend taking medication with a food containing fat.   At this visit, we will continue a daily dose of Accutane at 80 mg daily   His triglycerides were markedly elevated in the past (487). Encouraged good eating habits with fish oil supplement bid. Will recheck triglycerides today, 412.  One month supply with no refills provided.  Goal dose is 19,050 to 27,940 mg for 150-220 mg/kg dosing in this 127 kg patient.      Labs to be obtained today: AST/ALT, triglycerides will be obtained.     Continue daily fish oil supplement. Hx elevated triglycerides.     Total cumulative dose 12,000 mg (94.5 mg/kg)  Patient's I-pledge # is [id number].     The patient will stop all acne medications         Follow-up in 1 month, earlier for new or changing lesions.     Staff Involved:  Scribe/Staff    Scribe Disclosure:   ICarroll, am serving as a scribe to document services personally performed by Kiara Mahajan PA-C, based on data collection and the provider's statements to me.    Provider Disclosure:   The documentation recorded by the scribe accurately reflects the services I personally performed and the decisions made by me.    All risks, benefits and alternatives were discussed with patient.  Patient is in agreement and understands the assessment and plan.  All questions were answered.  Sun Screen Education was given.   Return to Clinic in 1 month or sooner as needed.   Kiara Mahajan PA-C   [facility]

## 2019-09-18 ENCOUNTER — OFFICE VISIT (OUTPATIENT)
Dept: DERMATOLOGY | Facility: CLINIC | Age: 24
End: 2019-09-18
Payer: COMMERCIAL

## 2019-09-18 DIAGNOSIS — Z79.899 ON ISOTRETINOIN THERAPY: Primary | ICD-10-CM

## 2019-09-18 DIAGNOSIS — Z79.899 ON ISOTRETINOIN THERAPY: ICD-10-CM

## 2019-09-18 DIAGNOSIS — L70.0 ACNE VULGARIS: ICD-10-CM

## 2019-09-18 LAB
ALT SERPL W P-5'-P-CCNC: 52 U/L (ref 0–70)
AST SERPL W P-5'-P-CCNC: 25 U/L (ref 0–45)
TRIGL SERPL-MCNC: 280 MG/DL

## 2019-09-18 RX ORDER — ISOTRETINOIN 40 MG/1
80 CAPSULE ORAL
Qty: 60 CAPSULE | Refills: 0 | Status: SHIPPED | OUTPATIENT
Start: 2019-09-18 | End: 2019-10-14

## 2019-09-18 ASSESSMENT — PAIN SCALES - GENERAL: PAINLEVEL: NO PAIN (0)

## 2019-09-18 NOTE — NURSING NOTE
Dermatology Rooming Note    Jose Enrique Beaver's goals for this visit include:   Chief Complaint   Patient presents with     Accutane     Oneil is here today for an accutane follow up.      BYRON Chance

## 2019-09-18 NOTE — PROGRESS NOTES
[facility] Dermatology Note      Dermatology Problem List:  1. Acne vulgaris,   - Initiated isotretinoin 40 mg daily on 3/9/19, increased to 80 mg on 4/10/19. IPledge #[id number], currently at the end of month #6    Encounter Date: Sep 18, 2019    CC:  Chief Complaint   Patient presents with     Accutane     Oneil is here today for an accutane follow up.          History of Present Illness:  Mr. Jose Enrique Beaver is a 24 year old male who presents as a follow-up for acne. The patient was last seen on 08/14/2019 by Kiara Mahajan PA-C when 80mg isotretinoin every day was continued. The patient is currently at the end of month #6. At today's visit, the patient notes that he has not had a breakout in the last month. Continues with OTC fish oil supplementation. The patient states he experiences some dryness, soreness of the back. The patient denies headaches, vision changes, and mood changes. The patient reports tolerable mucocutaneous dryness, and denies arthralgias, myalgias, depression, suicidal ideation, diarrhea, headache, or blurred vision.      Past Medical History:   Patient Active Problem List   Diagnosis     Allergy to peanuts     Acne     Past Medical History:   Diagnosis Date     NO ACTIVE PROBLEMS      Past Surgical History:   Procedure Laterality Date     ARTHROSCOPY ANKLE  3/1/2013    Procedure: ARTHROSCOPY ANKLE;  Left ankle arthroscopy Osteochondral dessicans drilling;  Surgeon: Quinton Macias MD;  Location: [place] OR     CIRCUMCISION,CLAMP  1996       Social History:   reports that he has never smoked. He has never used smokeless tobacco. He reports that he does not drink alcohol or use drugs.    Family History:  Family History   Problem Relation Age of Onset     Asthma Father      Hypertension Father      Lipids Maternal Grandmother      Gastrointestinal Disease Maternal Grandmother         Non-alcoholic Serosis     Cancer Maternal Grandfather      Lipids Maternal Grandfather       Lipids Paternal Grandmother      Heart Disease Paternal Grandfather      Lipids Paternal Grandfather      Myocardial Infarction Paternal Grandfather      Myocardial Infarction Maternal Aunt         UNDER AGE 50       Medications:  Current Outpatient Medications   Medication Sig Dispense Refill     BENADRYL ALLERGY OR PRN       cetirizine HCl (ZYRTEC ALLERGY) 10 MG CAPS        EPINEPHrine (EPIPEN) 0.3 MG/0.3ML injection Inject 0.3 mLs (0.3 mg) into the muscle once as needed for anaphylaxis 2 each 1     ISOtretinoin (ACCUTANE) 40 MG capsule Take 2 capsules (80 mg) by mouth daily with food 60 capsule 0     Omega-3 Fatty Acids (FISH OIL BURP-LESS) 1000 MG CAPS Take 4 g by mouth daily 120 capsule 5     dexamethasone (DECADRON) 4 MG tablet Take 2 tablets (8 mg) by mouth daily (with breakfast) for 2 days 4 tablet 0       Allergies   Allergen Reactions     Cat Hair [Cats]      Dogs      Peanuts [Nuts]      Hives, tounge swells         Review of Systems:  -Neuro: no HA or vision changes  -GI: No nausea, blood in stool, diarrhea, hx of IBD  -Psych: no depression/anxiety, mood changes, or sleep problems   -Musculoskeletal: no joint or muscle pain or swelling   -Heme/Lymph: no concerning bumps, no bleeding problems  -Constitutional: The patient denies fatigue, fevers, chills, unintended weight loss, and night sweats.  -HEENT: Patient denies nonhealing oral sores.  -Skin: As above in HPI. No additional skin concerns.    Physical exam:  Vitals: There were no vitals taken for this visit.  GEN: This is a well developed, well-nourished male in no acute distress, in a pleasant mood.    SKIN: Acne exam, which includes the face, neck, upper central chest, and upper central back was performed.  -The face, chest and upper back is clear  -No other lesions of concern on areas examined.       Impression/Plan:  1. Acne vulgaris, on isotretinoin- at the end of month # 6    Reminded pt of side effects, including dryness. Advised regular  use of emollients.  Recommend taking medication with a food containing fat.     At this visit, we will continue a daily dose of Accutane at 80 mg daily     His triglycerides were markedly elevated in the past (487). Encouraged good eating habits with fish oil supplement bid. Will recheck triglycerides today, 280.    One month supply with no refills provided.  Goal dose is 19,050 to 27,940 mg for 150-220 mg/kg dosing in this 127 kg patient.      Labs to be obtained today: AST/ALT, triglycerides will be obtained.     Continue daily fish oil supplement. Hx elevated triglycerides.     Total cumulative dose 14,400 mg (113.39 mg/kg)    Patient's I-pledge # is [id number].     The patient will stop all acne medications     CC Dr. Glass on close of this encounter.  Follow-up in 1 month, earlier for new or changing lesions.       Staff Involved:  Staff/Scribe    Scribe Disclosure:  JANUSZ, Antonio Beebe, am serving as a scribe to document services personally performed by Sanaz Bhatia PA-C, based on data collection and the provider's statements to me.     Provider Disclosure:   The documentation recorded by the scribe accurately reflects the services I personally performed and the decisions made by me.    All risks, benefits and alternatives were discussed with patient.  Patient is in agreement and understands the assessment and plan.  All questions were answered.    Sanaz Bhatia PA-C  [facility]: Phone: [phone], Fax: [phone]

## 2019-09-18 NOTE — LETTER
9/18/2019       RE: Jose Enrique Beaver  [address] [place] [address]     Dear Colleague,    Thank you for referring your patient, Jose Enrique Beaver, to the [facility] DERMATOLOGY at [facility]. Please see a copy of my visit note below.    [facility] Dermatology Note      Dermatology Problem List:  1. Acne vulgaris,   - Initiated isotretinoin 40 mg daily on 3/9/19, increased to 80 mg on 4/10/19. IPledge #[id number], currently at the end of month #6    Encounter Date: Sep 18, 2019    CC:  Chief Complaint   Patient presents with     Accutane     Oneil is here today for an accutane follow up.          History of Present Illness:  Mr. Jose Enrique Beaver is a 24 year old male who presents as a follow-up for acne. The patient was last seen on 08/14/2019 by Kiara Mahajan PA-C when 80mg isotretinoin every day was continued. The patient is currently at the end of month #6. At today's visit, the patient notes that he has not had a breakout in the last month. Continues with OTC fish oil supplementation. The patient states he experiences some dryness, soreness of the back. The patient denies headaches, vision changes, and mood changes. The patient reports tolerable mucocutaneous dryness, and denies arthralgias, myalgias, depression, suicidal ideation, diarrhea, headache, or blurred vision.      Past Medical History:   Patient Active Problem List   Diagnosis     Allergy to peanuts     Acne     Past Medical History:   Diagnosis Date     NO ACTIVE PROBLEMS      Past Surgical History:   Procedure Laterality Date     ARTHROSCOPY ANKLE  3/1/2013    Procedure: ARTHROSCOPY ANKLE;  Left ankle arthroscopy Osteochondral dessicans drilling;  Surgeon: Quinton Macias MD;  Location: [place] OR     CIRCUMCISION,CLAMP  1996       Social History:   reports that he has never smoked. He has never used smokeless tobacco. He reports that he does not drink alcohol or use drugs.    Family  History:  Family History   Problem Relation Age of Onset     Asthma Father      Hypertension Father      Lipids Maternal Grandmother      Gastrointestinal Disease Maternal Grandmother         Non-alcoholic Serosis     Cancer Maternal Grandfather      Lipids Maternal Grandfather      Lipids Paternal Grandmother      Heart Disease Paternal Grandfather      Lipids Paternal Grandfather      Myocardial Infarction Paternal Grandfather      Myocardial Infarction Maternal Aunt         UNDER AGE 50       Medications:  Current Outpatient Medications   Medication Sig Dispense Refill     BENADRYL ALLERGY OR PRN       cetirizine HCl (ZYRTEC ALLERGY) 10 MG CAPS        EPINEPHrine (EPIPEN) 0.3 MG/0.3ML injection Inject 0.3 mLs (0.3 mg) into the muscle once as needed for anaphylaxis 2 each 1     ISOtretinoin (ACCUTANE) 40 MG capsule Take 2 capsules (80 mg) by mouth daily with food 60 capsule 0     Omega-3 Fatty Acids (FISH OIL BURP-LESS) 1000 MG CAPS Take 4 g by mouth daily 120 capsule 5     dexamethasone (DECADRON) 4 MG tablet Take 2 tablets (8 mg) by mouth daily (with breakfast) for 2 days 4 tablet 0       Allergies   Allergen Reactions     Cat Hair [Cats]      Dogs      Peanuts [Nuts]      Hives, tounge swells         Review of Systems:  -Neuro: no HA or vision changes  -GI: No nausea, blood in stool, diarrhea, hx of IBD  -Psych: no depression/anxiety, mood changes, or sleep problems   -Musculoskeletal: no joint or muscle pain or swelling   -Heme/Lymph: no concerning bumps, no bleeding problems  -Constitutional: The patient denies fatigue, fevers, chills, unintended weight loss, and night sweats.  -HEENT: Patient denies nonhealing oral sores.  -Skin: As above in HPI. No additional skin concerns.    Physical exam:  Vitals: There were no vitals taken for this visit.  GEN: This is a well developed, well-nourished male in no acute distress, in a pleasant mood.    SKIN: Acne exam, which includes the face, neck, upper central chest,  and upper central back was performed.  -The face, chest and upper back is clear  -No other lesions of concern on areas examined.       Impression/Plan:  1. Acne vulgaris, on isotretinoin- at the end of month #  6    Reminded pt of side effects, including dryness. Advised regular use of emollients.  Recommend taking medication with a food containing fat.     At this visit, we will continue a daily dose of Accutane at 80 mg daily     His triglycerides were markedly elevated in the past (487). Encouraged good eating habits with fish oil supplement bid. Will recheck triglycerides today, 280.    One month supply with no refills provided.  Goal dose is 19,050 to 27,940 mg for 150-220 mg/kg dosing in this 127 kg patient.      Labs to be obtained today: AST/ALT, triglycerides will be obtained.     Continue daily fish oil supplement. Hx elevated triglycerides.     Total cumulative dose 1 4,400 mg (113.39 mg/kg)    Patient's I-pledge # is [id number].     The patient will stop all acne medications     CC Dr. Glass on close of this encounter.  Follow-up in 1 month, earlier for new or changing lesions.       Staff Involved:  Staff/Scribe    Scribe Disclosure:  I, Antonio Beebe, am serving as a scribe to document services personally performed by Sanaz Bhatia PA-C, based on data collection and the provider's statements to me.     Provider Disclosure:   The documentation recorded by the scribe accurately reflects the services I personally performed and the decisions made by me.    All risks, benefits and alternatives were discussed with patient.  Patient is in agreement and understands the assessment and plan.  All questions were answered.    Sanaz Bhatia PA-C  [facility]: Phone: [phone], Fax: [phone]

## 2019-10-14 ENCOUNTER — OFFICE VISIT (OUTPATIENT)
Dept: DERMATOLOGY | Facility: CLINIC | Age: 24
End: 2019-10-14
Payer: COMMERCIAL

## 2019-10-14 DIAGNOSIS — Z79.899 ON ISOTRETINOIN THERAPY: ICD-10-CM

## 2019-10-14 DIAGNOSIS — L70.0 ACNE VULGARIS: ICD-10-CM

## 2019-10-14 DIAGNOSIS — L70.0 ACNE VULGARIS: Primary | ICD-10-CM

## 2019-10-14 LAB
ALT SERPL W P-5'-P-CCNC: 56 U/L (ref 0–70)
AST SERPL W P-5'-P-CCNC: 28 U/L (ref 0–45)
TRIGL SERPL-MCNC: 282 MG/DL

## 2019-10-14 RX ORDER — ISOTRETINOIN 40 MG/1
80 CAPSULE ORAL
Qty: 60 CAPSULE | Refills: 0 | Status: SHIPPED | OUTPATIENT
Start: 2019-10-14 | End: 2020-02-24

## 2019-10-14 ASSESSMENT — PAIN SCALES - GENERAL: PAINLEVEL: NO PAIN (0)

## 2019-10-14 NOTE — LETTER
10/14/2019       RE: Jose Enrique Beaver  [address] [place] [address]     Dear Colleague,    Thank you for referring your patient, Jose Enrique Beaver, to the [facility] DERMATOLOGY at [facility]. Please see a copy of my visit note below.    [facility] Dermatology Note      Dermatology Problem List:  1. Acne vulgaris,   - Initiated isotretinoin 40 mg daily on 3/9/19, increased to 80 mg on 4/10/19. IPledge #[id number], currently at the end of month #7    Encounter Date: Oct 14, 2019    CC:  Chief Complaint   Patient presents with     Derm Problem     Josefinane Oneil his treatment is going well, denies adverse effetcs other than dryness.          History of Present Illness:  Mr. Jose Enrique Beaver is a 24 year old male who presents as a follow-up for acne. The patient was last seen on 09/18/19 by Ashley Bhatia PA-C during which he continued isotretinoin 80 mg daily. Today he is at the end of month # 7. He reports that his acne is doing well. Denies any breakouts over the last month.    The patient reports tolerable mucocutaneous dryness, and denies arthralgias, myalgias, depression, suicidal ideation, diarrhea, headache, or blurred vision.      Past Medical History:   Patient Active Problem List   Diagnosis     Allergy to peanuts     Acne     Past Medical History:   Diagnosis Date     NO ACTIVE PROBLEMS      Past Surgical History:   Procedure Laterality Date     ARTHROSCOPY ANKLE  3/1/2013    Procedure: ARTHROSCOPY ANKLE;  Left ankle arthroscopy Osteochondral dessicans drilling;  Surgeon: Quinton Macias MD;  Location: [place] OR     CIRCUMCISION,CLAMP  1996       Social History:   reports that he has never smoked. He has never used smokeless tobacco. He reports that he does not drink alcohol or use drugs.   He is a  at the [employer], studying computer science    Family History:  Family History   Problem Relation Age of Onset     Asthma  Father      Hypertension Father      Lipids Maternal Grandmother      Gastrointestinal Disease Maternal Grandmother         Non-alcoholic Serosis     Cancer Maternal Grandfather      Lipids Maternal Grandfather      Lipids Paternal Grandmother      Heart Disease Paternal Grandfather      Lipids Paternal Grandfather      Myocardial Infarction Paternal Grandfather      Myocardial Infarction Maternal Aunt         UNDER AGE 50       Medications:  Current Outpatient Medications   Medication Sig Dispense Refill     BENADRYL ALLERGY OR PRN       cetirizine HCl (ZYRTEC ALLERGY) 10 MG CAPS        dexamethasone (DECADRON) 4 MG tablet Take 2 tablets (8 mg) by mouth daily (with breakfast) for 2 days 4 tablet 0     EPINEPHrine (EPIPEN) 0.3 MG/0.3ML injection Inject 0.3 mLs (0.3 mg) into the muscle once as needed for anaphylaxis 2 each 1     ISOtretinoin (ACCUTANE) 40 MG capsule Take 2 capsules (80 mg) by mouth daily with food 60 capsule 0     Omega-3 Fatty Acids (FISH OIL BURP-LESS) 1000 MG CAPS Take 4 g by mouth daily 120 capsule 5       Allergies   Allergen Reactions     Cat Hair [Cats]      Dogs      Peanuts [Nuts]      Hives, tounge swells         Review of Systems:  -Neuro: no HA or vision changes  -GI: No nausea, blood in stool, diarrhea, hx of IBD  -Psych: no depression/anxiety, mood changes, or sleep problems   -Musculoskeletal: no joint or muscle pain or swelling   -Heme/Lymph: no concerning bumps, no bleeding problems  -Constitutional: The patient denies fatigue, fevers, chills, unintended weight loss, and night sweats.  -HEENT: Patient denies nonhealing oral sores.  -Skin: As above in HPI. No additional skin concerns.    Physical exam:  Vitals: There were no vitals taken for this visit.  GEN: This is a well developed, well-nourished male in no acute distress, in a pleasant mood.    SKIN: Acne exam, which includes the face, neck, upper central chest, and upper central back was performed.  -The face, chest and upper  back is clear. No active acne.   -No other lesions of concern on areas examined.       Impression/Plan:  1. Acne vulgaris, on isotretinoin- at the end of month #  7    Reminded pt of side effects, including dryness. Advised regular use of emollients.  Recommend taking medication with a food containing fat.     At this visit, we will continue a daily dose of Accutane at 80 mg daily     Continue fish oil daily     One month supply with no refills provided.  Goal dose is 19,050 to 27,940 mg for 150-220 mg/kg dosing in this 127 kg patient.      Labs to be obtained today: AST/ALT, triglycerides will be obtained.     Continue daily fish oil supplement. Hx elevated triglycerides.     Total cumulative dose 1 6,800 mg (132.3 mg/kg)    Will anticipate 1-2 additional months of medication    Patient's I-pledge # is [id number].     Follow-up in 1 month, earlier for new or changing lesions.       Staff Involved:  Staff/Scribe    Scribe Disclosure:   Kellie BOUDREAUX, am serving as a scribe to document services personally performed by Kiara Mahajan PA-C, based on data collection and the provider's statements to me.    Provider Disclosure:   The documentation recorded by the scribe accurately reflects the services I personally performed and the decisions made by me.    All risks, benefits and alternatives were discussed with patient.  Patient is in agreement and understands the assessment and plan.  All questions were answered.  Sun Screen Education was given.   Return to Clinic in 1 month or sooner as needed.   Kiara Mahajan PA-C   [facility]

## 2019-10-14 NOTE — NURSING NOTE
Dermatology Rooming Note    Jose Enrique Beaver's goals for this visit include:   Chief Complaint   Patient presents with     Derm Problem     Accutane , Oneil starashel his treatment is going well, denies adverse effetcs other than dryness.      Mona Garay LPN

## 2019-11-08 ENCOUNTER — HEALTH MAINTENANCE LETTER (OUTPATIENT)
Age: 24
End: 2019-11-08

## 2019-11-18 ENCOUNTER — OFFICE VISIT (OUTPATIENT)
Dept: DERMATOLOGY | Facility: CLINIC | Age: 24
End: 2019-11-18
Payer: COMMERCIAL

## 2019-11-18 VITALS — BODY MASS INDEX: 31.37 KG/M2 | WEIGHT: 256 LBS

## 2019-11-18 DIAGNOSIS — L70.0 ACNE VULGARIS: Primary | ICD-10-CM

## 2019-11-18 DIAGNOSIS — Z79.899 ON ISOTRETINOIN THERAPY: ICD-10-CM

## 2019-11-18 ASSESSMENT — PAIN SCALES - GENERAL: PAINLEVEL: NO PAIN (0)

## 2019-11-18 NOTE — PROGRESS NOTES
[facility] Dermatology Note      Dermatology Problem List:  1. Acne vulgaris  - s/p Accutane: Initiated isotretinoin 40 mg daily on 3/9/19, increased to 80 mg on 4/10/19. IPledge #[id number], currently at the end of month #8  - End Accutane dosage on 11/18/19: 19,200 mg (165.4 mg/kg)    Encounter Date: Nov 18, 2019    CC:  Chief Complaint   Patient presents with     Accutane     Oneil is here today for Accutane follow up. No concerns.          History of Present Illness:  Mr. Jose Enrique Beaver is a 24 year old male who presents as a follow-up for acne. The patient was last seen on 10/14/19 during which he continued isotretinoin 80 mg daily. Today he is at the end of month # 8.     Today he reports that his face, chest, and back have continued to be clear of acne. He has not had any breakouts in the past month. He does note that over the course of his Accutane treatment he believes he has lost some weight. He has about one week left of medication.    Denies headaches, visual changes, epistaxis, confusion, fever, arthralgias, myalgias, abdominal pain, stool changes, hematochezia, mood changes, depression or suicidal ideations.    Past Medical History:   Patient Active Problem List   Diagnosis     Allergy to peanuts     Acne     Past Medical History:   Diagnosis Date     NO ACTIVE PROBLEMS      Past Surgical History:   Procedure Laterality Date     ARTHROSCOPY ANKLE  3/1/2013    Procedure: ARTHROSCOPY ANKLE;  Left ankle arthroscopy Osteochondral dessicans drilling;  Surgeon: Quinton Macias MD;  Location: [place] OR     CIRCUMCISION,CLAMP  1996       Social History:   reports that he has never smoked. He has never used smokeless tobacco. He reports that he does not drink alcohol or use drugs.   He is a  at the [employer], studying computer science    Family History:  Family History   Problem Relation Age of Onset     Asthma Father      Hypertension Father      Lipids Maternal  Grandmother      Gastrointestinal Disease Maternal Grandmother         Non-alcoholic Serosis     Cancer Maternal Grandfather      Lipids Maternal Grandfather      Lipids Paternal Grandmother      Heart Disease Paternal Grandfather      Lipids Paternal Grandfather      Myocardial Infarction Paternal Grandfather      Myocardial Infarction Maternal Aunt         UNDER AGE 50       Medications:  Current Outpatient Medications   Medication Sig Dispense Refill     BENADRYL ALLERGY OR PRN       cetirizine HCl (ZYRTEC ALLERGY) 10 MG CAPS        EPINEPHrine (EPIPEN) 0.3 MG/0.3ML injection Inject 0.3 mLs (0.3 mg) into the muscle once as needed for anaphylaxis 2 each 1     ISOtretinoin (ACCUTANE) 40 MG capsule Take 2 capsules (80 mg) by mouth daily with food 60 capsule 0     Omega-3 Fatty Acids (FISH OIL BURP-LESS) 1000 MG CAPS Take 4 g by mouth daily 120 capsule 5     dexamethasone (DECADRON) 4 MG tablet Take 2 tablets (8 mg) by mouth daily (with breakfast) for 2 days 4 tablet 0       Allergies   Allergen Reactions     Cat Hair [Cats]      Dogs      Peanuts [Nuts]      Hives, tounge swells         Review of Systems:  -Neuro: no HA or vision changes  -GI: No nausea, blood in stool, diarrhea, hx of IBD  -Psych: no depression/anxiety, mood changes, or sleep problems   -Musculoskeletal: no joint or muscle pain or swelling   -Heme/Lymph: no concerning bumps, no bleeding problems  -Constitutional: The patient denies fatigue, fevers, chills, unintended weight loss, and night sweats.  -HEENT: Patient denies nonhealing oral sores.  -Skin: As above in HPI. No additional skin concerns.    Physical exam:  Vitals: Wt 116.1 kg (256 lb)   BMI 31.37 kg/m    GEN: This is a well developed, well-nourished male in no acute distress, in a pleasant mood.    SKIN: Acne exam, which includes the face, neck, upper central chest, and upper central back was performed.    - The face, chest, and upper back are all clear with no active acne.   - No other  lesions of concern on areas examined.       Impression/Plan:  1. Acne vulgaris, on isotretinoin- at the end of month # 8    Reminded pt of side effects, including dryness. Advised continued regular use of emollients.      Complete remaining one week of Accutane at 80 mg daily     Goal dose is 19,050 to 27,940 mg for 150-220 mg/kg dosing in this 116.1kg patient (down from 127 kg at the start of Accutane course).      No more labs required     Continue daily fish oil supplement during the next week. Hx elevated triglycerides.     Total cumulative dose 19,200 mg (165.4 mg/kg)    Will discontinue medication at this visit as he is within the goal dosage range for his weight    Patient's I-pledge # is [id number].     Follow-up in 3 months, earlier for new or changing lesions.     Staff Involved:  Scribe/Staff    Scribe Disclosure:   Carroll BOUDREAUX, am serving as a scribe to document services personally performed by Kiara Mahajan PA-C, based on data collection and the provider's statements to me.    Provider Disclosure:   The documentation recorded by the scribe accurately reflects the services I personally performed and the decisions made by me.    All risks, benefits and alternatives were discussed with patient.  Patient is in agreement and understands the assessment and plan.  All questions were answered.  Sun Screen Education was given.   Return to Clinic in 3 months or sooner as needed.   Kiara Mahajan PA-C   [facility]erm[facility]

## 2020-02-24 ENCOUNTER — OFFICE VISIT (OUTPATIENT)
Dept: DERMATOLOGY | Facility: CLINIC | Age: 25
End: 2020-02-24
Payer: COMMERCIAL

## 2020-02-24 DIAGNOSIS — L90.5 ACNE SCARRING: Primary | ICD-10-CM

## 2020-02-24 RX ORDER — TRETINOIN 0.5 MG/G
CREAM TOPICAL AT BEDTIME
Qty: 45 G | Refills: 11 | Status: SHIPPED | OUTPATIENT
Start: 2020-02-24

## 2020-02-24 ASSESSMENT — PAIN SCALES - GENERAL: PAINLEVEL: NO PAIN (0)

## 2020-02-24 NOTE — NURSING NOTE
Chief Complaint   Patient presents with     Derm Problem     Oneil is here for Acne follow up.     Germania Zuniga LPN

## 2020-02-24 NOTE — LETTER
2/24/2020       RE: Jose Enrique Beaver  [address] [place] [address]     Dear Colleague,    Thank you for referring your patient, Jose Enrique Beaver, to the [facility] DERMATOLOGY at [facility]. Please see a copy of my visit note below.    [facility] Dermatology Note      Dermatology Problem List:  1. Acne vulgaris  - s/p Accutane: Initiated isotretinoin 40 mg daily on 3/9/19, increased to 80 mg on 4/10/19. IPledge #[id number], currently at the end of month #8  - End Accutane dosage on 11/18/19: 19,200 mg (165.4 mg/kg)  - Tretinoin 0.05% cream    Encounter Date: Feb 24, 2020    CC:  Chief Complaint   Patient presents with     Derm Problem     Oneil is here for Acne follow up.         History of Present Illness:  Mr. Jose Enrique Beaver is a 24 year old male who presents as a follow-up for acne. The patient was last seen on 11/18/19 when he discontinued Accutane after 8 months on the medication to a final dose of 19,200 mg (165.4 mg/kg) .    He notes that his face, chest, and back are all clear of acne. He has not had any breakouts since the last visit. He still has residual acne scarring but he is not bothered by its appearance.     Otherwise he is feeling well, without additional skin concerns at this time.      Past Medical History:   Patient Active Problem List   Diagnosis     Allergy to peanuts     Acne     Past Medical History:   Diagnosis Date     NO ACTIVE PROBLEMS      Past Surgical History:   Procedure Laterality Date     ARTHROSCOPY ANKLE  3/1/2013    Procedure: ARTHROSCOPY ANKLE;  Left ankle arthroscopy Osteochondral dessicans drilling;  Surgeon: Quinton Macias MD;  Location: [place] OR     CIRCUMCISION,CLAMP  1996       Social History:   reports that he has never smoked. He has never used smokeless tobacco. He reports that he does not drink alcohol or use drugs.   He is a  at the  [employer] , studying computer science    Family  History:  Family History   Problem Relation Age of Onset     Asthma Father      Hypertension Father      Lipids Maternal Grandmother      Gastrointestinal Disease Maternal Grandmother         Non-alcoholic Serosis     Cancer Maternal Grandfather      Lipids Maternal Grandfather      Lipids Paternal Grandmother      Heart Disease Paternal Grandfather      Lipids Paternal Grandfather      Myocardial Infarction Paternal Grandfather      Myocardial Infarction Maternal Aunt         UNDER AGE 50       Medications:  Current Outpatient Medications   Medication Sig Dispense Refill     BENADRYL ALLERGY OR PRN       cetirizine HCl (ZYRTEC ALLERGY) 10 MG CAPS        EPINEPHrine (EPIPEN) 0.3 MG/0.3ML injection Inject 0.3 mLs (0.3 mg) into the muscle once as needed for anaphylaxis 2 each 1     ISOtretinoin (ACCUTANE) 40 MG capsule Take 2 capsules (80 mg) by mouth daily with food 60 capsule 0     Omega-3 Fatty Acids (FISH OIL BURP-LESS) 1000 MG CAPS Take 4 g by mouth daily 120 capsule 5     dexamethasone (DECADRON) 4 MG tablet Take 2 tablets (8 mg) by mouth daily (with breakfast) for 2 days 4 tablet 0       Allergies   Allergen Reactions     Cat Hair [Cats]      Dogs      Peanuts [Nuts]      Hives, tounge swells         Review of Systems:  -Neuro: no HA or vision changes  -GI: No nausea, blood in stool, diarrhea, hx of IBD  -Psych: no depression/anxiety, mood changes, or sleep problems   -Musculoskeletal: no joint or muscle pain or swelling   -Heme/Lymph: no concerning bumps, no bleeding problems  -Constitutional: The patient denies fatigue, fevers, chills, unintended weight loss, and night sweats.  -HEENT: Patient denies nonhealing oral sores.  -Skin: As above in HPI. No additional skin concerns.    Physical exam:  Vitals: There were no vitals taken for this visit.  GEN: This is a well developed, well-nourished male in no acute distress, in a pleasant mood.    SKIN: Acne exam, which includes the face, neck, upper central chest,  and upper central back was performed.    - Skin colored rolling acne scars scattered to the upper, mid and lower back.  - The face, chest, and upper back are all clear with no active acne.   - No other lesions of concern on areas examined.       Impression/Plan:   1. Acne scarring and Acne vulgaris, S/p 8 months of isotretinoin to a dose of 19,200 mg (165.4 mg/kg) . Will start tretinoin cream for maintenance, if patient desires     Start tretinoin 0.05% cream to face and back. Instructed to apply topical acne medication once every other day and increase to nightly as tolerate.  Waiting 20-30 minutes after washing affected area(s) will decrease irritation. Method of application, side effects and expected results were discussed. The patient will apply pea size amount to the entire face, avoid areas around the eyes, corners of nose and mouth. Discussed side effects including photosensitivity and irritation.    Okay to donate blood at this time.     Avoid any elective surgery for another 3 months.     Follow-up prn.    Staff Involved:  Scribe/Staff    Scribe Disclosure:   I, Carroll Pat, am serving as a scribe to document services personally performed by Kiara Mahajan PA-C, based on data collection and the provider's statements to me.    Provider Disclosure:   The documentation recorded by the scribe accurately reflects the services I personally performed and the decisions made by me.    All risks, benefits and alternatives were discussed with patient.  Patient is in agreement and understands the assessment and plan.  All questions were answered.  Sun Screen Education was given.   Return to Clinic annually, if using tretinoin or sooner as needed.   Kiara Mahajan PA-C   [facility]

## 2020-02-24 NOTE — PROGRESS NOTES
[facility] Dermatology Note      Dermatology Problem List:  1. Acne vulgaris  - s/p Accutane: Initiated isotretinoin 40 mg daily on 3/9/19, increased to 80 mg on 4/10/19. IPledge #[id number], currently at the end of month #8  - End Accutane dosage on 11/18/19: 19,200 mg (165.4 mg/kg)  - Tretinoin 0.05% cream    Encounter Date: Feb 24, 2020    CC:  Chief Complaint   Patient presents with     Derm Problem     Oneil is here for Acne follow up.         History of Present Illness:  Mr. Jose Enrique Beaver is a 24 year old male who presents as a follow-up for acne. The patient was last seen on 11/18/19 when he discontinued Accutane after 8 months on the medication to a final dose of 19,200 mg (165.4 mg/kg).    He notes that his face, chest, and back are all clear of acne. He has not had any breakouts since the last visit. He still has residual acne scarring but he is not bothered by its appearance.     Otherwise he is feeling well, without additional skin concerns at this time.      Past Medical History:   Patient Active Problem List   Diagnosis     Allergy to peanuts     Acne     Past Medical History:   Diagnosis Date     NO ACTIVE PROBLEMS      Past Surgical History:   Procedure Laterality Date     ARTHROSCOPY ANKLE  3/1/2013    Procedure: ARTHROSCOPY ANKLE;  Left ankle arthroscopy Osteochondral dessicans drilling;  Surgeon: Quinton Macias MD;  Location: [place] OR     CIRCUMCISION,CLAMP  1996       Social History:   reports that he has never smoked. He has never used smokeless tobacco. He reports that he does not drink alcohol or use drugs.   He is a  at the [employer], studying computer science    Family History:  Family History   Problem Relation Age of Onset     Asthma Father      Hypertension Father      Lipids Maternal Grandmother      Gastrointestinal Disease Maternal Grandmother         Non-alcoholic Serosis     Cancer Maternal Grandfather      Lipids Maternal Grandfather       Lipids Paternal Grandmother      Heart Disease Paternal Grandfather      Lipids Paternal Grandfather      Myocardial Infarction Paternal Grandfather      Myocardial Infarction Maternal Aunt         UNDER AGE 50       Medications:  Current Outpatient Medications   Medication Sig Dispense Refill     BENADRYL ALLERGY OR PRN       cetirizine HCl (ZYRTEC ALLERGY) 10 MG CAPS        EPINEPHrine (EPIPEN) 0.3 MG/0.3ML injection Inject 0.3 mLs (0.3 mg) into the muscle once as needed for anaphylaxis 2 each 1     ISOtretinoin (ACCUTANE) 40 MG capsule Take 2 capsules (80 mg) by mouth daily with food 60 capsule 0     Omega-3 Fatty Acids (FISH OIL BURP-LESS) 1000 MG CAPS Take 4 g by mouth daily 120 capsule 5     dexamethasone (DECADRON) 4 MG tablet Take 2 tablets (8 mg) by mouth daily (with breakfast) for 2 days 4 tablet 0       Allergies   Allergen Reactions     Cat Hair [Cats]      Dogs      Peanuts [Nuts]      Hives, tounge swells         Review of Systems:  -Neuro: no HA or vision changes  -GI: No nausea, blood in stool, diarrhea, hx of IBD  -Psych: no depression/anxiety, mood changes, or sleep problems   -Musculoskeletal: no joint or muscle pain or swelling   -Heme/Lymph: no concerning bumps, no bleeding problems  -Constitutional: The patient denies fatigue, fevers, chills, unintended weight loss, and night sweats.  -HEENT: Patient denies nonhealing oral sores.  -Skin: As above in HPI. No additional skin concerns.    Physical exam:  Vitals: There were no vitals taken for this visit.  GEN: This is a well developed, well-nourished male in no acute distress, in a pleasant mood.    SKIN: Acne exam, which includes the face, neck, upper central chest, and upper central back was performed.    - Skin colored rolling acne scars scattered to the upper, mid and lower back.  - The face, chest, and upper back are all clear with no active acne.   - No other lesions of concern on areas examined.       Impression/Plan:   1. Acne scarring and  Acne vulgaris, S/p 8 months of isotretinoin to a dose of 19,200 mg (165.4 mg/kg). Will start tretinoin cream for maintenance, if patient desires     Start tretinoin 0.05% cream to face and back. Instructed to apply topical acne medication once every other day and increase to nightly as tolerate.  Waiting 20-30 minutes after washing affected area(s) will decrease irritation. Method of application, side effects and expected results were discussed. The patient will apply pea size amount to the entire face, avoid areas around the eyes, corners of nose and mouth. Discussed side effects including photosensitivity and irritation.    Okay to donate blood at this time.     Avoid any elective surgery for another 3 months.     Follow-up prn.    Staff Involved:  Scribe/Staff    Scribe Disclosure:   I, Carroll Pat, am serving as a scribe to document services personally performed by Kiara Mahajan PA-C, based on data collection and the provider's statements to me.    Provider Disclosure:   The documentation recorded by the scribe accurately reflects the services I personally performed and the decisions made by me.    All risks, benefits and alternatives were discussed with patient.  Patient is in agreement and understands the assessment and plan.  All questions were answered.  Sun Screen Education was given.   Return to Clinic annually, if using tretinoin or sooner as needed.   Kiara Mahajan PA-C   [facility]erm[facility]

## 2020-12-06 ENCOUNTER — HEALTH MAINTENANCE LETTER (OUTPATIENT)
Age: 25
End: 2020-12-06

## 2021-09-25 ENCOUNTER — HEALTH MAINTENANCE LETTER (OUTPATIENT)
Age: 26
End: 2021-09-25

## 2022-01-15 ENCOUNTER — HEALTH MAINTENANCE LETTER (OUTPATIENT)
Age: 27
End: 2022-01-15

## 2023-01-07 ENCOUNTER — HEALTH MAINTENANCE LETTER (OUTPATIENT)
Age: 28
End: 2023-01-07

## 2023-04-22 ENCOUNTER — HEALTH MAINTENANCE LETTER (OUTPATIENT)
Age: 28
End: 2023-04-22

## 2024-02-01 NOTE — PROGRESS NOTES
[facility] Dermatology Note      Dermatology Problem List:  1. Acne vulgaris,   - Initiated isotretinoin 40 mg daily on 3/9/19, increased to 80 mg on 4/10/19. IPledge #[id number], currently at the end of month #7    Encounter Date: Oct 14, 2019    CC:  Chief Complaint   Patient presents with     Derm Problem     Acctonine Oneil his treatment is going well, denies adverse effetcs other than dryness.          History of Present Illness:  Mr. Jose Enrique Beaver is a 24 year old male who presents as a follow-up for acne. The patient was last seen on 09/18/19 by Ashley Bhatia PA-C during which he continued isotretinoin 80 mg daily. Today he is at the end of month # 7. He reports that his acne is doing well. Denies any breakouts over the last month.    The patient reports tolerable mucocutaneous dryness, and denies arthralgias, myalgias, depression, suicidal ideation, diarrhea, headache, or blurred vision.      Past Medical History:   Patient Active Problem List   Diagnosis     Allergy to peanuts     Acne     Past Medical History:   Diagnosis Date     NO ACTIVE PROBLEMS      Past Surgical History:   Procedure Laterality Date     ARTHROSCOPY ANKLE  3/1/2013    Procedure: ARTHROSCOPY ANKLE;  Left ankle arthroscopy Osteochondral dessicans drilling;  Surgeon: Quinton Macias MD;  Location: [facility]     CIRCUMCISION,CLAMP  1996       Social History:   reports that he has never smoked. He has never used smokeless tobacco. He reports that he does not drink alcohol or use drugs.   He is a  at the [employer], studying computer science    Family History:  Family History   Problem Relation Age of Onset     Asthma Father      Hypertension Father      Lipids Maternal Grandmother      Gastrointestinal Disease Maternal Grandmother         Non-alcoholic Serosis     Cancer Maternal Grandfather      Lipids Maternal Grandfather      Lipids Paternal Grandmother      Heart Disease Paternal  Grandfather      Lipids Paternal Grandfather      Myocardial Infarction Paternal Grandfather      Myocardial Infarction Maternal Aunt         UNDER AGE 50       Medications:  Current Outpatient Medications   Medication Sig Dispense Refill     BENADRYL ALLERGY OR PRN       cetirizine HCl (ZYRTEC ALLERGY) 10 MG CAPS        dexamethasone (DECADRON) 4 MG tablet Take 2 tablets (8 mg) by mouth daily (with breakfast) for 2 days 4 tablet 0     EPINEPHrine (EPIPEN) 0.3 MG/0.3ML injection Inject 0.3 mLs (0.3 mg) into the muscle once as needed for anaphylaxis 2 each 1     ISOtretinoin (ACCUTANE) 40 MG capsule Take 2 capsules (80 mg) by mouth daily with food 60 capsule 0     Omega-3 Fatty Acids (FISH OIL BURP-LESS) 1000 MG CAPS Take 4 g by mouth daily 120 capsule 5       Allergies   Allergen Reactions     Cat Hair [Cats]      Dogs      Peanuts [Nuts]      Hives, tounge swells         Review of Systems:  -Neuro: no HA or vision changes  -GI: No nausea, blood in stool, diarrhea, hx of IBD  -Psych: no depression/anxiety, mood changes, or sleep problems   -Musculoskeletal: no joint or muscle pain or swelling   -Heme/Lymph: no concerning bumps, no bleeding problems  -Constitutional: The patient denies fatigue, fevers, chills, unintended weight loss, and night sweats.  -HEENT: Patient denies nonhealing oral sores.  -Skin: As above in HPI. No additional skin concerns.    Physical exam:  Vitals: There were no vitals taken for this visit.  GEN: This is a well developed, well-nourished male in no acute distress, in a pleasant mood.    SKIN: Acne exam, which includes the face, neck, upper central chest, and upper central back was performed.  -The face, chest and upper back is clear. No active acne.   -No other lesions of concern on areas examined.       Impression/Plan:  1. Acne vulgaris, on isotretinoin- at the end of month # 7    Reminded pt of side effects, including dryness. Advised regular use of emollients.  Recommend taking  medication with a food containing fat.     At this visit, we will continue a daily dose of Accutane at 80 mg daily     Continue fish oil daily     One month supply with no refills provided.  Goal dose is 19,050 to 27,940 mg for 150-220 mg/kg dosing in this 127 kg patient.      Labs to be obtained today: AST/ALT, triglycerides will be obtained.     Continue daily fish oil supplement. Hx elevated triglycerides.     Total cumulative dose 16,800 mg (132.3 mg/kg)    Will anticipate 1-2 additional months of medication    Patient's I-pledge # is [id number].     Follow-up in 1 month, earlier for new or changing lesions.       Staff Involved:  Staff/Scribe    Scribe Disclosure:   I, Kellie Hernandez, am serving as a scribe to document services personally performed by Kiara Mahajan PA-C, based on data collection and the provider's statements to me.    Provider Disclosure:   The documentation recorded by the scribe accurately reflects the services I personally performed and the decisions made by me.    All risks, benefits and alternatives were discussed with patient.  Patient is in agreement and understands the assessment and plan.  All questions were answered.  Sun Screen Education was given.   Return to Clinic in 1 month or sooner as needed.   Kiara Mahajan PA-C   [facility]                      Juan F Masom